# Patient Record
Sex: FEMALE | Race: WHITE | NOT HISPANIC OR LATINO | Employment: UNEMPLOYED | URBAN - METROPOLITAN AREA
[De-identification: names, ages, dates, MRNs, and addresses within clinical notes are randomized per-mention and may not be internally consistent; named-entity substitution may affect disease eponyms.]

---

## 2018-04-23 PROBLEM — R29.818 TRANSIENT NEUROLOGICAL SYMPTOMS: Status: ACTIVE | Noted: 2018-04-23

## 2018-09-10 ENCOUNTER — HISTORICAL (OUTPATIENT)
Dept: CARDIOLOGY | Facility: HOSPITAL | Age: 68
End: 2018-09-10

## 2020-11-30 ENCOUNTER — HISTORICAL (OUTPATIENT)
Dept: PREADMISSION TESTING | Facility: HOSPITAL | Age: 70
End: 2020-11-30

## 2020-11-30 LAB
ABS NEUT (OLG): 4.42 X10(3)/MCL (ref 2.1–9.2)
ALBUMIN SERPL-MCNC: 3.4 GM/DL (ref 3.4–4.8)
ALBUMIN/GLOB SERPL: 1 RATIO (ref 1.1–2)
ALP SERPL-CCNC: 94 UNIT/L (ref 40–150)
ALT SERPL-CCNC: 31 UNIT/L (ref 0–55)
AST SERPL-CCNC: 18 UNIT/L (ref 5–34)
BASOPHILS # BLD AUTO: 0 X10(3)/MCL (ref 0–0.2)
BASOPHILS NFR BLD AUTO: 0 %
BILIRUB SERPL-MCNC: 0.5 MG/DL
BILIRUBIN DIRECT+TOT PNL SERPL-MCNC: 0.2 MG/DL (ref 0–0.5)
BILIRUBIN DIRECT+TOT PNL SERPL-MCNC: 0.3 MG/DL (ref 0–0.8)
BUN SERPL-MCNC: 16.2 MG/DL (ref 9.8–20.1)
CALCIUM SERPL-MCNC: 9 MG/DL (ref 8.4–10.2)
CHLORIDE SERPL-SCNC: 100 MMOL/L (ref 98–107)
CO2 SERPL-SCNC: 28 MMOL/L (ref 23–31)
CREAT SERPL-MCNC: 0.71 MG/DL (ref 0.55–1.02)
EOSINOPHIL # BLD AUTO: 0.2 X10(3)/MCL (ref 0–0.9)
EOSINOPHIL NFR BLD AUTO: 3 %
ERYTHROCYTE [DISTWIDTH] IN BLOOD BY AUTOMATED COUNT: 13.5 % (ref 11.5–17)
GLOBULIN SER-MCNC: 3.4 GM/DL (ref 2.4–3.5)
GLUCOSE SERPL-MCNC: 78 MG/DL (ref 82–115)
HCT VFR BLD AUTO: 40.7 % (ref 37–47)
HGB BLD-MCNC: 12.9 GM/DL (ref 12–16)
LYMPHOCYTES # BLD AUTO: 1.6 X10(3)/MCL (ref 0.6–4.6)
LYMPHOCYTES NFR BLD AUTO: 22 %
MCH RBC QN AUTO: 28 PG (ref 27–31)
MCHC RBC AUTO-ENTMCNC: 31.7 GM/DL (ref 33–36)
MCV RBC AUTO: 88.3 FL (ref 80–94)
MONOCYTES # BLD AUTO: 1 X10(3)/MCL (ref 0.1–1.3)
MONOCYTES NFR BLD AUTO: 14 %
NEUTROPHILS # BLD AUTO: 4.42 X10(3)/MCL (ref 2.1–9.2)
NEUTROPHILS NFR BLD AUTO: 61 %
PLATELET # BLD AUTO: 455 X10(3)/MCL (ref 130–400)
PMV BLD AUTO: 9.8 FL (ref 9.4–12.4)
POTASSIUM SERPL-SCNC: 4.9 MMOL/L (ref 3.5–5.1)
PROT SERPL-MCNC: 6.8 GM/DL (ref 5.8–7.6)
RBC # BLD AUTO: 4.61 X10(6)/MCL (ref 4.2–5.4)
SODIUM SERPL-SCNC: 139 MMOL/L (ref 136–145)
WBC # SPEC AUTO: 7.3 X10(3)/MCL (ref 4.5–11.5)

## 2020-12-03 ENCOUNTER — HISTORICAL (OUTPATIENT)
Dept: ADMINISTRATIVE | Facility: HOSPITAL | Age: 70
End: 2020-12-03

## 2022-04-10 ENCOUNTER — HISTORICAL (OUTPATIENT)
Dept: ADMINISTRATIVE | Facility: HOSPITAL | Age: 72
End: 2022-04-10
Payer: MEDICARE

## 2022-04-25 VITALS
DIASTOLIC BLOOD PRESSURE: 58 MMHG | BODY MASS INDEX: 32.07 KG/M2 | SYSTOLIC BLOOD PRESSURE: 137 MMHG | HEIGHT: 62 IN | WEIGHT: 174.25 LBS

## 2022-04-30 NOTE — OP NOTE
Patient:   Teetee Soto            MRN: [id number]            FIN: [id number]               Age:   70 years     Sex:  Female     :  1950   Associated Diagnoses:   None   Author:   Cirilo Palacios MD            DATE OF SURGERY:   12/3/20    SURGEON:  Cirilo Palacios MD    PREOPERATIVE DIAGNOSIS:  Symptomatic Cholelithiasis    POST OPERATIVE DIAGNOSIS:  Same.    PROCEDURE:  Laparoscopic cholecystectomy.    ANESTHESIA:  General endotracheal anesthesia.    ESTIMATED BLOOD LOSS:  Approximately 20 cc.   Blood administered, none.    LAP AND INSTRUMENT COUNT:  Correct X2 at the end of the case.    SPECIMENS:  Gallbladder.    INDICATION AND SIGNIFICANT HISTORY:  Patient is a 70-year-old female complaining of post prandial right upper quadrant pain associated with fatty meals.  Patient was worked up clincally and found to have symptomatic cholelithiasis.  Risks and benefits of surgery was discussed with the patient who voiced understanding of risks / benefits and elected to proceed with surgery.        PROCEDURE IN DETAIL:  Once informed consents were obtained, patient was taken to the operating room and placed supine on the operating table.  After general endotracheal anesthesia was induced, the abdomen was prepped and draped in the standard sterile surgical fashion.  Periumbilical incision was made with the scalpel and the Veress needle was used to enter the abdominal cavity.  Pneumoperitoneum was then created with insufflation.  After adequate insufflation was obtained, 11 millimeter trocar port were placed through this wound.  Two additional 5 millimeter ports were placed in the right upper quadrant followed by 5 millimeter trocar placed subxiphoid.  The gallbladder was then visualized appeared to have chronic inflammatory changes and retracted both superiorly and laterally to achieve the critical view.  Dissection was carried out in lateral to medial fashion.  The cystic duct were first visualized  followed by cystic artery appropriate.  Two clips were placed twice proximally on each structure and one distally and then transected.  The gallbladder was then removed from the liver bed using the hook cautery and passed off the table as specimen through the periumbilical trocar port site.  Attention was then redirected to the gallbladder fossa, no active bleeding was noted.  Good hemostasis was visualized.  All ports were then removed under direct visualization with no active bleeding noted.  Skin was then closed with 4-0 Vicryl suture in interrupted fashion.  Skin was  cleaned and dry sterile dressing was placed on the wound.  Lap and instrument  counts were correct X2 at the end of the case.  Patient tolerated the procedure well and was transferred to recovery room in good condition.

## 2022-06-10 DIAGNOSIS — M65.332 TRIGGER MIDDLE FINGER OF LEFT HAND: Primary | ICD-10-CM

## 2022-06-13 ENCOUNTER — HOSPITAL ENCOUNTER (OUTPATIENT)
Dept: RADIOLOGY | Facility: HOSPITAL | Age: 72
Discharge: HOME | End: 2022-06-13
Attending: SPECIALIST
Payer: MEDICARE

## 2022-06-13 ENCOUNTER — ANESTHESIA EVENT (OUTPATIENT)
Dept: SURGERY | Facility: HOSPITAL | Age: 72
End: 2022-06-13
Payer: MEDICARE

## 2022-06-13 ENCOUNTER — CLINICAL SUPPORT (OUTPATIENT)
Dept: RESPIRATORY THERAPY | Facility: HOSPITAL | Age: 72
End: 2022-06-13
Attending: SPECIALIST
Payer: MEDICARE

## 2022-06-13 DIAGNOSIS — M65.332 TRIGGER MIDDLE FINGER OF LEFT HAND: ICD-10-CM

## 2022-06-13 DIAGNOSIS — Z01.818 PRE-OP EXAM: ICD-10-CM

## 2022-06-13 PROCEDURE — 71046 X-RAY EXAM CHEST 2 VIEWS: CPT | Mod: TC

## 2022-06-13 PROCEDURE — 93005 ELECTROCARDIOGRAM TRACING: CPT

## 2022-06-13 RX ORDER — VIT C/E/ZN/COPPR/LUTEIN/ZEAXAN 250MG-90MG
1000 CAPSULE ORAL EVERY MORNING
COMMUNITY

## 2022-06-13 RX ORDER — LISINOPRIL 20 MG/1
20 TABLET ORAL 2 TIMES DAILY
Status: ON HOLD | COMMUNITY
Start: 2021-07-08 | End: 2023-11-10 | Stop reason: SDUPTHER

## 2022-06-13 RX ORDER — MELOXICAM 7.5 MG/1
7.5 TABLET ORAL DAILY
COMMUNITY
Start: 2021-07-08

## 2022-06-13 RX ORDER — CALCIUM CARBONATE/VITAMIN D3 500-10/5ML
1 LIQUID (ML) ORAL 2 TIMES DAILY
COMMUNITY

## 2022-06-13 RX ORDER — TRIAMTERENE AND HYDROCHLOROTHIAZIDE 37.5; 25 MG/1; MG/1
1 CAPSULE ORAL NIGHTLY
Status: ON HOLD | COMMUNITY
Start: 2022-05-19 | End: 2023-11-10 | Stop reason: HOSPADM

## 2022-06-13 RX ORDER — IBUPROFEN 100 MG/5ML
1000 SUSPENSION, ORAL (FINAL DOSE FORM) ORAL EVERY MORNING
COMMUNITY

## 2022-06-13 RX ORDER — METOPROLOL TARTRATE 50 MG/1
50 TABLET ORAL NIGHTLY
COMMUNITY
Start: 2022-05-19 | End: 2023-08-17

## 2022-06-13 RX ORDER — PRAVASTATIN SODIUM 20 MG/1
20 TABLET ORAL NIGHTLY
COMMUNITY
Start: 2022-05-11 | End: 2023-08-17

## 2022-06-13 RX ORDER — ASPIRIN 81 MG/1
81 TABLET ORAL NIGHTLY
COMMUNITY

## 2022-06-13 NOTE — ANESTHESIA PREPROCEDURE EVALUATION
06/13/2022  Teetee Soto is a 71 y.o., female.      Pre-op Assessment    I have reviewed the Patient Summary Reports.     I have reviewed the Nursing Notes. I have reviewed the NPO Status.   I have reviewed the Medications.     Review of Systems  Anesthesia Hx:  Denies Family Hx of Anesthesia complications.   Denies Personal Hx of Anesthesia complications.   Social:  Former Smoker, No Alcohol Use    Hematology/Oncology:  Hematology Normal   Oncology Normal     EENT/Dental:EENT/Dental Normal   Cardiovascular:   Hypertension ECG has been reviewed. No old EKG available for comparison.   Pulmonary:  Pulmonary Normal    Renal/:  Renal/ Normal     Hepatic/GI:  Hepatic/GI Normal    Neurological:  Neurology Normal    Endocrine:  Endocrine Normal           Anesthesia Plan  Type of Anesthesia, risks & benefits discussed:    Anesthesia Type: Gen Natural Airway  Intra-op Monitoring Plan: Standard ASA Monitors  Post Op Pain Control Plan: multimodal analgesia  Induction:  IV  Informed Consent: Informed consent signed with the Patient and all parties understand the risks and agree with anesthesia plan.  All questions answered. Patient consented to blood products? Yes  ASA Score: 3    Ready For Surgery From Anesthesia Perspective.     .

## 2022-06-14 ENCOUNTER — HOSPITAL ENCOUNTER (OUTPATIENT)
Facility: HOSPITAL | Age: 72
Discharge: HOME | End: 2022-06-14
Attending: SPECIALIST | Admitting: SPECIALIST
Payer: MEDICARE

## 2022-06-14 ENCOUNTER — ANESTHESIA (OUTPATIENT)
Dept: SURGERY | Facility: HOSPITAL | Age: 72
End: 2022-06-14
Payer: MEDICARE

## 2022-06-14 DIAGNOSIS — M65.332 TRIGGER FINGER, LEFT MIDDLE FINGER: Primary | ICD-10-CM

## 2022-06-14 DIAGNOSIS — M65.30 TRIGGER FINGER OF LEFT HAND: ICD-10-CM

## 2022-06-14 PROCEDURE — 63600175 PHARM REV CODE 636 W HCPCS

## 2022-06-14 PROCEDURE — 37000008 HC ANESTHESIA 1ST 15 MINUTES: Performed by: SPECIALIST

## 2022-06-14 PROCEDURE — 36000707: Performed by: SPECIALIST

## 2022-06-14 PROCEDURE — 71000015 HC POSTOP RECOV 1ST HR: Performed by: SPECIALIST

## 2022-06-14 PROCEDURE — 37000009 HC ANESTHESIA EA ADD 15 MINS: Performed by: SPECIALIST

## 2022-06-14 PROCEDURE — 71000016 HC POSTOP RECOV ADDL HR: Performed by: SPECIALIST

## 2022-06-14 PROCEDURE — 36000706: Performed by: SPECIALIST

## 2022-06-14 PROCEDURE — 25000003 PHARM REV CODE 250: Performed by: SPECIALIST

## 2022-06-14 PROCEDURE — 63600175 PHARM REV CODE 636 W HCPCS: Performed by: ANESTHESIOLOGY

## 2022-06-14 PROCEDURE — 25000003 PHARM REV CODE 250

## 2022-06-14 RX ORDER — HYDROCODONE BITARTRATE AND ACETAMINOPHEN 10; 325 MG/1; MG/1
1 TABLET ORAL EVERY 8 HOURS PRN
Qty: 20 TABLET | Refills: 0 | Status: SHIPPED | OUTPATIENT
Start: 2022-06-14 | End: 2023-08-17

## 2022-06-14 RX ORDER — HYDROCODONE BITARTRATE AND ACETAMINOPHEN 5; 325 MG/1; MG/1
1 TABLET ORAL EVERY 6 HOURS PRN
Status: DISCONTINUED | OUTPATIENT
Start: 2022-06-14 | End: 2022-06-14 | Stop reason: HOSPADM

## 2022-06-14 RX ORDER — PROPOFOL 10 MG/ML
INJECTION, EMULSION INTRAVENOUS
Status: DISCONTINUED | OUTPATIENT
Start: 2022-06-14 | End: 2022-06-14

## 2022-06-14 RX ORDER — DOXYCYCLINE 100 MG/1
100 CAPSULE ORAL EVERY 12 HOURS
Qty: 14 CAPSULE | Refills: 0 | Status: SHIPPED | OUTPATIENT
Start: 2022-06-14 | End: 2022-06-21

## 2022-06-14 RX ORDER — MORPHINE SULFATE 10 MG/ML
2 INJECTION INTRAMUSCULAR; INTRAVENOUS; SUBCUTANEOUS EVERY 4 HOURS PRN
Status: DISCONTINUED | OUTPATIENT
Start: 2022-06-14 | End: 2022-06-14 | Stop reason: HOSPADM

## 2022-06-14 RX ORDER — SODIUM CHLORIDE, SODIUM LACTATE, POTASSIUM CHLORIDE, CALCIUM CHLORIDE 600; 310; 30; 20 MG/100ML; MG/100ML; MG/100ML; MG/100ML
INJECTION, SOLUTION INTRAVENOUS CONTINUOUS
Status: ACTIVE | OUTPATIENT
Start: 2022-06-14

## 2022-06-14 RX ORDER — BUPIVACAINE HYDROCHLORIDE 5 MG/ML
INJECTION, SOLUTION EPIDURAL; INTRACAUDAL
Status: DISCONTINUED | OUTPATIENT
Start: 2022-06-14 | End: 2022-06-14 | Stop reason: HOSPADM

## 2022-06-14 RX ORDER — LIDOCAINE HCL/PF 100 MG/5ML
SYRINGE (ML) INTRAVENOUS
Status: DISCONTINUED | OUTPATIENT
Start: 2022-06-14 | End: 2022-06-14

## 2022-06-14 RX ORDER — TRAMADOL HYDROCHLORIDE 50 MG/1
50 TABLET ORAL EVERY 6 HOURS PRN
Status: DISCONTINUED | OUTPATIENT
Start: 2022-06-14 | End: 2022-06-14 | Stop reason: HOSPADM

## 2022-06-14 RX ORDER — ONDANSETRON 4 MG/1
8 TABLET, ORALLY DISINTEGRATING ORAL EVERY 8 HOURS PRN
Status: DISCONTINUED | OUTPATIENT
Start: 2022-06-14 | End: 2022-06-14 | Stop reason: HOSPADM

## 2022-06-14 RX ORDER — ONDANSETRON 2 MG/ML
4 INJECTION INTRAMUSCULAR; INTRAVENOUS EVERY 12 HOURS PRN
Status: DISCONTINUED | OUTPATIENT
Start: 2022-06-14 | End: 2022-06-14 | Stop reason: HOSPADM

## 2022-06-14 RX ADMIN — LIDOCAINE HYDROCHLORIDE 20 MG: 20 INJECTION, SOLUTION INTRAVENOUS at 06:06

## 2022-06-14 RX ADMIN — SODIUM CHLORIDE, POTASSIUM CHLORIDE, SODIUM LACTATE AND CALCIUM CHLORIDE: 600; 310; 30; 20 INJECTION, SOLUTION INTRAVENOUS at 05:06

## 2022-06-14 RX ADMIN — PROPOFOL 60 MG: 10 INJECTION, EMULSION INTRAVENOUS at 06:06

## 2022-06-14 NOTE — OP NOTE
Operative note    Date: 6/14/2022     Preop diagnosis:  Left long trigger finger    Postop diagnosis:  Same    Procedure:  Left long trigger finger release    Surgeon:  Adi Brower MD    Assistant: EZE Cornejo    Procedure in detail:  This patient was brought to the operating room.  He was administered a local anesthetic over the A1 pulley of the left long finger.  His arm was prepped and draped.  The tourniquet was inflated to 250 mmHg.  A transverse incision was made directly over the A1 pulley of the left ring finger.  Using loupe magnification we clearly identified the A1 pulley.  Retractors were positioned to protect surrounding structures.  Under direct visualization the A1 pulley was released with a 15 blade scalpel.  The tendon was inspected and found to be intact.  Following this the patient was able to actively flex and extend the fingers fully with no further triggering.  The wound was irrigated with saline.  The skin was closed with 4-0 nylon.  A sterile dressing was applied.  The patient tolerated the procedure well.    Adi Brower MD

## 2022-06-14 NOTE — ANESTHESIA POSTPROCEDURE EVALUATION
Anesthesia Post Evaluation    Patient: Teetee Soto    Procedure(s) Performed: Procedure(s) (LRB):  RELEASE, TRIGGER FINGER / longer left finger (Left)    Final Anesthesia Type: general      Patient location during evaluation: [facility]  Patient participation: Yes- Able to Participate  Level of consciousness: awake and alert  Post-procedure vital signs: reviewed and stable  Pain management: adequate  Airway patency: patent    PONV status at discharge: No PONV  Anesthetic complications: no      Cardiovascular status: blood pressure returned to baseline and hemodynamically stable  Respiratory status: unassisted and room air  Hydration status: euvolemic  Follow-up not needed.          Vitals Value Taken Time   /69 06/14/22 0555   Temp 36 °C (96.8 °F) 06/14/22 0555   Pulse 53 06/14/22 0555   Resp 20 06/14/22 0555   SpO2 98 % 06/14/22 0555         No case tracking events are documented in the log.      Pain/Pedro Score: No data recorded

## 2022-06-14 NOTE — DISCHARGE SUMMARY
[facility] - Periop Services  Discharge Note  Short Stay    Procedure(s) (LRB):  RELEASE, TRIGGER FINGER / longer left finger (Left)    OUTCOME: Patient tolerated treatment/procedure well without complication and is now ready for discharge.    DISPOSITION: Home or Self Care    FINAL DIAGNOSIS:  Trigger finger, left middle finger    FOLLOWUP: In clinic    DISCHARGE INSTRUCTIONS:    Discharge Procedure Orders   Diet general     Keep surgical extremity elevated     Ice to affected area   Order Comments: using barrier between ice and skin (specify duration&frequency)     No driving, operating heavy equipment or signing legal documents while taking pain medication     Call MD for:  temperature >100.4     Call MD for:  persistent nausea and vomiting     Call MD for:  severe uncontrolled pain     Call MD for:  difficulty breathing, headache or visual disturbances     Call MD for:  redness, tenderness, or signs of infection (pain, swelling, redness, odor or green/yellow discharge around incision site)     Call MD for:  hives     Call MD for:  persistent dizziness or light-headedness     Remove dressing in 48 hours   Order Comments: Replace with dry dressing as needed     Weight bearing restrictions (specify)         Clinical Reference Documents Added to Patient Instructions       Document    TRIGGER FINGER RELEASE DISCHARGE INSTRUCTIONS (ENGLISH)          TIME SPENT ON DISCHARGE: 20 minutes

## 2022-06-15 VITALS
HEART RATE: 50 BPM | RESPIRATION RATE: 18 BRPM | HEIGHT: 61 IN | TEMPERATURE: 97 F | WEIGHT: 175 LBS | SYSTOLIC BLOOD PRESSURE: 157 MMHG | DIASTOLIC BLOOD PRESSURE: 70 MMHG | OXYGEN SATURATION: 98 % | BODY MASS INDEX: 33.04 KG/M2

## 2023-08-17 ENCOUNTER — OFFICE VISIT (OUTPATIENT)
Dept: OBSTETRICS AND GYNECOLOGY | Facility: CLINIC | Age: 73
End: 2023-08-17
Payer: MEDICARE

## 2023-08-17 VITALS
HEART RATE: 52 BPM | BODY MASS INDEX: 32.44 KG/M2 | HEIGHT: 62 IN | DIASTOLIC BLOOD PRESSURE: 62 MMHG | WEIGHT: 176.31 LBS | SYSTOLIC BLOOD PRESSURE: 158 MMHG

## 2023-08-17 DIAGNOSIS — B00.1 FEVER BLISTER: ICD-10-CM

## 2023-08-17 DIAGNOSIS — Z12.31 SCREENING MAMMOGRAM FOR BREAST CANCER: ICD-10-CM

## 2023-08-17 DIAGNOSIS — Z01.419 ROUTINE GYNECOLOGICAL EXAMINATION: Primary | ICD-10-CM

## 2023-08-17 PROCEDURE — G0101 CA SCREEN;PELVIC/BREAST EXAM: HCPCS | Mod: ,,, | Performed by: OBSTETRICS & GYNECOLOGY

## 2023-08-17 PROCEDURE — 88174 CYTOPATH C/V AUTO IN FLUID: CPT | Performed by: OBSTETRICS & GYNECOLOGY

## 2023-08-17 PROCEDURE — G0101 PR CA SCREEN;PELVIC/BREAST EXAM: ICD-10-PCS | Mod: ,,, | Performed by: OBSTETRICS & GYNECOLOGY

## 2023-08-17 RX ORDER — VALACYCLOVIR HYDROCHLORIDE 1 G/1
2000 TABLET, FILM COATED ORAL EVERY 12 HOURS
Qty: 4 TABLET | Refills: 3 | Status: ON HOLD | OUTPATIENT
Start: 2023-08-17 | End: 2023-11-10 | Stop reason: HOSPADM

## 2023-08-17 RX ORDER — METOPROLOL TARTRATE 25 MG/1
25 TABLET, FILM COATED ORAL 2 TIMES DAILY
COMMUNITY
Start: 2023-07-06

## 2023-08-17 RX ORDER — MOMETASONE FUROATE 1 MG/ML
SOLUTION TOPICAL 2 TIMES DAILY
Status: ON HOLD | COMMUNITY
Start: 2023-03-16 | End: 2023-11-10 | Stop reason: HOSPADM

## 2023-08-17 RX ORDER — KETOCONAZOLE 20 MG/ML
1 SHAMPOO, SUSPENSION TOPICAL
COMMUNITY
Start: 2023-03-16

## 2023-08-17 RX ORDER — FLUOXETINE 10 MG/1
10 CAPSULE ORAL NIGHTLY
COMMUNITY
Start: 2023-06-12

## 2023-08-17 RX ORDER — ROSUVASTATIN CALCIUM 20 MG/1
20 TABLET, COATED ORAL NIGHTLY
COMMUNITY
Start: 2023-07-29

## 2023-08-17 NOTE — PROGRESS NOTES
Patient ID: [id number]   Chief Complaint: Annual exam  Chief Complaint   Patient presents with    Annual Exam     NO C/O'S.     HPI:   Teetee Soto is a 73 y.o. year old  here for her Annual Exam. No LMP recorded. Patient is postmenopausal. NO C/O'S. WOULD LIKE VALTREX FOR FEVER BLISTERS TO BE REFILLED AS SHE HAS THESE OFTEN AND ARE VERY PAINFUL.    Subjective:     Past Medical History:   Diagnosis Date    Arthritis     Breast cancer     High cholesterol     HTN (hypertension)     Trigger finger, left middle finger 2022     Past Surgical History:   Procedure Laterality Date    CHOLECYSTECTOMY      COLONOSCOPY      MASTECTOMY Left     TRIGGER FINGER RELEASE Left 2022    Procedure: RELEASE, TRIGGER FINGER / longer left finger;  Surgeon: Adi Brower MD;  Location: [facility];  Service: Orthopedics;  Laterality: Left;    TUBAL LIGATION       Social History     Tobacco Use    Smoking status: Former    Smokeless tobacco: Never   Substance Use Topics    Alcohol use: Never    Drug use: Never     Family History   Problem Relation Age of Onset    No Known Problems Father     Cancer Mother     Hypertension Mother      OB History    Para Term  AB Living   3 3 3     3   SAB IAB Ectopic Multiple Live Births           3      # Outcome Date GA Lbr Myles/2nd Weight Sex Delivery Anes PTL Lv   3 Term 10/27/75    F Vag-Spont   KOBI   2 Term 70    M Vag-Spont   KOBI   1 Term 10/14/68    M Vag-Spont   KOBI       Current Outpatient Medications:     ascorbic acid, vitamin C, (VITAMIN C) 1000 MG tablet, Take 1,000 mg by mouth every morning., Disp: , Rfl:     aspirin (ECOTRIN) 81 MG EC tablet, Take 81 mg by mouth every evening. Stopped 22, Disp: , Rfl:     cholecalciferol, vitamin D3, (VITAMIN D3) 25 mcg (1,000 unit) capsule, Take 1,000 Units by mouth every morning., Disp: , Rfl:     FLUoxetine 10 MG capsule, Take 10 mg by mouth., Disp: , Rfl:     ketoconazole (NIZORAL) 2 % shampoo, Apply topically  twice a week., Disp: , Rfl:     lisinopriL (PRINIVIL,ZESTRIL) 20 MG tablet, Take 20 mg by mouth 2 (two) times a day., Disp: , Rfl:     LYSINE ORAL, Take 1,000 mg by mouth every morning., Disp: , Rfl:     magnesium oxide 400 mg magnesium Cap, Take 1 tablet by mouth 2 (two) times a day., Disp: , Rfl:     meloxicam (MOBIC) 7.5 MG tablet, Take 7.5 mg by mouth every evening., Disp: , Rfl:     metoprolol tartrate (LOPRESSOR) 25 MG tablet, Take 25 mg by mouth 2 (two) times daily., Disp: , Rfl:     mometasone (ELOCON) 0.1 % solution, Apply topically 2 (two) times daily., Disp: , Rfl:     rosuvastatin (CRESTOR) 20 MG tablet, Take 20 mg by mouth every evening., Disp: , Rfl:     triamterene-hydrochlorothiazide 37.5-25 mg (DYAZIDE) 37.5-25 mg per capsule, Take 1 capsule by mouth every evening., Disp: , Rfl:     zinc 50 mg Tab, Take 1 tablet by mouth every morning., Disp: , Rfl:     valACYclovir (VALTREX) 1000 MG tablet, Take 2 tablets (2,000 mg total) by mouth every 12 (twelve) hours. for 2 doses, Disp: 4 tablet, Rfl: 3  No current facility-administered medications for this visit.    Facility-Administered Medications Ordered in Other Visits:     lactated ringers infusion, , Intravenous, Continuous, Jose Guadalupe Luis DO, Last Rate: 10 mL/hr at 06/14/22 0550, New Bag at 06/14/22 0550    PAP:  Last PAP: 7-8-21  History of Abnormal PAP Smear: NO  HPV Vaccine: NO  INTERCOURSE:  Dyspareunia: No  Postcoital Bleeding: No  History of STI: No  CURRENT BIRTH CONTROL: MENOPAUSAL / BTL  Sexually Active: yes  BREAST HISTORY:   Last Mammogram: 8-25-22  History of Abnormal Mammogram: YES:  BREAST CANCER LEFT MASTECTOMY    MENOPAUSE:  Post Menopausal Bleeding: No  Hormone Replacement Therapy: No  COLONOSCOPY:  Last Colonoscopy: SCHEDULED 8-31-23          Review of Systems 12 point review of systems conducted, negative except as stated in the history of present illness. See HPI for details.  Objective:   Visit Vitals  BP (!) 158/62   Pulse (!)  "52   Ht 5' 2" (1.575 m)   Wt 80 kg (176 lb 4.8 oz)   BMI 32.25 kg/m²     Physical Exam:  Physical Exam  Constitutional:  General Appearance : alert, in no acute distress, normal, well nourished.  Respiratory:  Respiratory Effort: normal.  Breast:  Right: Inspection/palpation: no discharge, no masses present, no nipple retraction, no skin changes, no skin dimpling, no tenderness, no lymphadenopathy, no axillary mass, no axillary tenderness.  Left: Inspection/palpation: no discharge, no masses present, no nipple retraction, no skin changes, no skin dimpling, no tenderness, no lymphadenopathy, no axillary mass, no axillary tenderness.  Gastrointestinal:  Abdomen: no masses. no tender, nondistended.  Liver and spleen: normal  Hernias: no hernias present, no inguinal adenopathy.  Genitourinary:  External Genitalia: normal, no lesions.  Vagina: normal appearance, no abnormal discharge, no lesions.  Bladder: no mass, nontender.  Urethra: no erythema or lesions present.  Cervix: no lesions, non tender. Pap Done  Uterus: nontender, normal contour, normal mobility, normal size.   Adnexa: no masses, no tenderness.  Anus and Perineum: visually normal.   Chaperone Present  No results found for this or any previous visit (from the past 24 hour(s)).  Assessment/Plan:   Assessment:   Routine gynecological examination  -     Liquid-Based Pap Smear, Screening Screening    Screening mammogram for breast cancer  -     Mammo Digital Screening Bilat; Future; Expected date: 08/26/2023    Fever blister  -     valACYclovir (VALTREX) 1000 MG tablet; Take 2 tablets (2,000 mg total) by mouth every 12 (twelve) hours. for 2 doses  Dispense: 4 tablet; Refill: 3      Follow up in about 1 year (around 8/17/2024) for ANNUAL. In addition to their scheduled FU, the patient has also been instructed to follow up on as needed basis. All questions were answered and the patient voiced understanding of the above issues.      "

## 2023-08-22 LAB — PSYCHE PATHOLOGY RESULT: NORMAL

## 2023-08-24 ENCOUNTER — LAB VISIT (OUTPATIENT)
Dept: LAB | Facility: HOSPITAL | Age: 73
End: 2023-08-24
Attending: INTERNAL MEDICINE
Payer: MEDICARE

## 2023-08-24 DIAGNOSIS — Z86.010 PERSONAL HISTORY OF COLONIC POLYPS: ICD-10-CM

## 2023-08-24 DIAGNOSIS — Z12.11 SPECIAL SCREENING FOR MALIGNANT NEOPLASMS, COLON: Primary | ICD-10-CM

## 2023-08-24 DIAGNOSIS — I10 ESSENTIAL HYPERTENSION, MALIGNANT: ICD-10-CM

## 2023-08-24 LAB
ALBUMIN SERPL-MCNC: 4.3 G/DL (ref 3.4–4.8)
ALBUMIN/GLOB SERPL: 1.7 RATIO (ref 1.1–2)
ALP SERPL-CCNC: 79 UNIT/L (ref 40–150)
ALT SERPL-CCNC: 14 UNIT/L (ref 0–55)
AST SERPL-CCNC: 21 UNIT/L (ref 5–34)
BASOPHILS # BLD AUTO: 0.04 X10(3)/MCL
BASOPHILS NFR BLD AUTO: 0.6 %
BILIRUB SERPL-MCNC: 1 MG/DL
BUN SERPL-MCNC: 11.2 MG/DL (ref 9.8–20.1)
CALCIUM SERPL-MCNC: 9.7 MG/DL (ref 8.4–10.2)
CHLORIDE SERPL-SCNC: 104 MMOL/L (ref 98–107)
CO2 SERPL-SCNC: 28 MMOL/L (ref 23–31)
CREAT SERPL-MCNC: 0.82 MG/DL (ref 0.55–1.02)
EOSINOPHIL # BLD AUTO: 0.27 X10(3)/MCL (ref 0–0.9)
EOSINOPHIL NFR BLD AUTO: 3.8 %
ERYTHROCYTE [DISTWIDTH] IN BLOOD BY AUTOMATED COUNT: 13.3 % (ref 11.5–17)
GFR SERPLBLD CREATININE-BSD FMLA CKD-EPI: >60 MLS/MIN/1.73/M2
GLOBULIN SER-MCNC: 2.6 GM/DL (ref 2.4–3.5)
GLUCOSE SERPL-MCNC: 96 MG/DL (ref 82–115)
HCT VFR BLD AUTO: 44 % (ref 37–47)
HGB BLD-MCNC: 14.6 G/DL (ref 12–16)
IMM GRANULOCYTES # BLD AUTO: 0.01 X10(3)/MCL (ref 0–0.04)
IMM GRANULOCYTES NFR BLD AUTO: 0.1 %
LYMPHOCYTES # BLD AUTO: 1.72 X10(3)/MCL (ref 0.6–4.6)
LYMPHOCYTES NFR BLD AUTO: 24.5 %
MCH RBC QN AUTO: 28.1 PG (ref 27–31)
MCHC RBC AUTO-ENTMCNC: 33.2 G/DL (ref 33–36)
MCV RBC AUTO: 84.8 FL (ref 80–94)
MONOCYTES # BLD AUTO: 0.61 X10(3)/MCL (ref 0.1–1.3)
MONOCYTES NFR BLD AUTO: 8.7 %
NEUTROPHILS # BLD AUTO: 4.38 X10(3)/MCL (ref 2.1–9.2)
NEUTROPHILS NFR BLD AUTO: 62.3 %
NRBC BLD AUTO-RTO: 0 %
PLATELET # BLD AUTO: 227 X10(3)/MCL (ref 130–400)
PMV BLD AUTO: 10.5 FL (ref 7.4–10.4)
POTASSIUM SERPL-SCNC: 4.7 MMOL/L (ref 3.5–5.1)
PROT SERPL-MCNC: 6.9 GM/DL (ref 5.8–7.6)
RBC # BLD AUTO: 5.19 X10(6)/MCL (ref 4.2–5.4)
SODIUM SERPL-SCNC: 140 MMOL/L (ref 136–145)
WBC # SPEC AUTO: 7.03 X10(3)/MCL (ref 4.5–11.5)

## 2023-08-24 PROCEDURE — 36415 COLL VENOUS BLD VENIPUNCTURE: CPT

## 2023-08-24 PROCEDURE — 85025 COMPLETE CBC W/AUTO DIFF WBC: CPT

## 2023-08-24 PROCEDURE — 93010 EKG 12-LEAD: ICD-10-PCS | Mod: ,,, | Performed by: INTERNAL MEDICINE

## 2023-08-24 PROCEDURE — 93010 ELECTROCARDIOGRAM REPORT: CPT | Mod: ,,, | Performed by: INTERNAL MEDICINE

## 2023-08-24 PROCEDURE — 80053 COMPREHEN METABOLIC PANEL: CPT

## 2023-08-24 PROCEDURE — 93005 ELECTROCARDIOGRAM TRACING: CPT

## 2023-10-31 RX ORDER — HYDRALAZINE HYDROCHLORIDE 25 MG/1
25 TABLET, FILM COATED ORAL 3 TIMES DAILY
Status: ON HOLD | COMMUNITY
End: 2023-11-10 | Stop reason: HOSPADM

## 2023-10-31 RX ORDER — CLOPIDOGREL BISULFATE 75 MG/1
75 TABLET ORAL NIGHTLY
COMMUNITY

## 2023-11-06 ENCOUNTER — ANESTHESIA EVENT (OUTPATIENT)
Dept: CARDIOLOGY | Facility: HOSPITAL | Age: 73
DRG: 036 | End: 2023-11-06
Payer: MEDICARE

## 2023-11-08 ENCOUNTER — ANESTHESIA (OUTPATIENT)
Dept: CARDIOLOGY | Facility: HOSPITAL | Age: 73
DRG: 036 | End: 2023-11-08
Payer: MEDICARE

## 2023-11-08 ENCOUNTER — HOSPITAL ENCOUNTER (INPATIENT)
Facility: HOSPITAL | Age: 73
LOS: 2 days | Discharge: HOME | DRG: 036 | End: 2023-11-10
Attending: INTERNAL MEDICINE | Admitting: INTERNAL MEDICINE
Payer: MEDICARE

## 2023-11-08 DIAGNOSIS — I10 HYPERTENSION: ICD-10-CM

## 2023-11-08 DIAGNOSIS — I65.21 OCCLUSION OF RIGHT CAROTID ARTERY: ICD-10-CM

## 2023-11-08 DIAGNOSIS — I25.10 CAD (CORONARY ARTERY DISEASE): ICD-10-CM

## 2023-11-08 LAB
ANION GAP SERPL CALC-SCNC: 8 MEQ/L
BASOPHILS # BLD AUTO: 0.03 X10(3)/MCL
BASOPHILS NFR BLD AUTO: 0.3 %
BUN SERPL-MCNC: 15.4 MG/DL (ref 9.8–20.1)
CALCIUM SERPL-MCNC: 8.3 MG/DL (ref 8.4–10.2)
CHLORIDE SERPL-SCNC: 108 MMOL/L (ref 98–107)
CO2 SERPL-SCNC: 24 MMOL/L (ref 23–31)
CREAT SERPL-MCNC: 0.7 MG/DL (ref 0.55–1.02)
CREAT/UREA NIT SERPL: 22
EOSINOPHIL # BLD AUTO: 0.06 X10(3)/MCL (ref 0–0.9)
EOSINOPHIL NFR BLD AUTO: 0.7 %
ERYTHROCYTE [DISTWIDTH] IN BLOOD BY AUTOMATED COUNT: 13.7 % (ref 11.5–17)
GFR SERPLBLD CREATININE-BSD FMLA CKD-EPI: >60 MLS/MIN/1.73/M2
GLUCOSE SERPL-MCNC: 89 MG/DL (ref 82–115)
HCT VFR BLD AUTO: 33.9 % (ref 37–47)
HGB BLD-MCNC: 11.2 G/DL (ref 12–16)
IMM GRANULOCYTES # BLD AUTO: 0.03 X10(3)/MCL (ref 0–0.04)
IMM GRANULOCYTES NFR BLD AUTO: 0.3 %
LYMPHOCYTES # BLD AUTO: 1.35 X10(3)/MCL (ref 0.6–4.6)
LYMPHOCYTES NFR BLD AUTO: 15.5 %
MCH RBC QN AUTO: 29.4 PG (ref 27–31)
MCHC RBC AUTO-ENTMCNC: 33 G/DL (ref 33–36)
MCV RBC AUTO: 89 FL (ref 80–94)
MONOCYTES # BLD AUTO: 0.52 X10(3)/MCL (ref 0.1–1.3)
MONOCYTES NFR BLD AUTO: 6 %
NEUTROPHILS # BLD AUTO: 6.71 X10(3)/MCL (ref 2.1–9.2)
NEUTROPHILS NFR BLD AUTO: 77.2 %
NRBC BLD AUTO-RTO: 0 %
PLATELET # BLD AUTO: 157 X10(3)/MCL (ref 130–400)
PMV BLD AUTO: 10.1 FL (ref 7.4–10.4)
POC ACTIVATED CLOTTING TIME K: 137 SEC (ref 74–137)
POCT GLUCOSE: 115 MG/DL (ref 70–110)
POCT GLUCOSE: 91 MG/DL (ref 70–110)
POTASSIUM SERPL-SCNC: 4.1 MMOL/L (ref 3.5–5.1)
RBC # BLD AUTO: 3.81 X10(6)/MCL (ref 4.2–5.4)
SAMPLE: NORMAL
SODIUM SERPL-SCNC: 140 MMOL/L (ref 136–145)
WBC # SPEC AUTO: 8.7 X10(3)/MCL (ref 4.5–11.5)

## 2023-11-08 PROCEDURE — 37000009 HC ANESTHESIA EA ADD 15 MINS: Performed by: INTERNAL MEDICINE

## 2023-11-08 PROCEDURE — C1753 CATH, INTRAVAS ULTRASOUND: HCPCS | Performed by: INTERNAL MEDICINE

## 2023-11-08 PROCEDURE — 63600175 PHARM REV CODE 636 W HCPCS: Performed by: ANESTHESIOLOGY

## 2023-11-08 PROCEDURE — 63600175 PHARM REV CODE 636 W HCPCS: Performed by: REGISTERED NURSE

## 2023-11-08 PROCEDURE — 85347 COAGULATION TIME ACTIVATED: CPT | Performed by: INTERNAL MEDICINE

## 2023-11-08 PROCEDURE — 25000003 PHARM REV CODE 250: Performed by: ANESTHESIOLOGY

## 2023-11-08 PROCEDURE — 93005 ELECTROCARDIOGRAM TRACING: CPT

## 2023-11-08 PROCEDURE — 99499 UNLISTED E&M SERVICE: CPT | Mod: ,,, | Performed by: THORACIC SURGERY (CARDIOTHORACIC VASCULAR SURGERY)

## 2023-11-08 PROCEDURE — D9220A PRA ANESTHESIA: Mod: CRNA,,, | Performed by: REGISTERED NURSE

## 2023-11-08 PROCEDURE — 27201423 OPTIME MED/SURG SUP & DEVICES STERILE SUPPLY: Performed by: INTERNAL MEDICINE

## 2023-11-08 PROCEDURE — 25500020 PHARM REV CODE 255: Performed by: INTERNAL MEDICINE

## 2023-11-08 PROCEDURE — 63600150 PHARM REV CODE 636: Performed by: NURSE PRACTITIONER

## 2023-11-08 PROCEDURE — C1876 STENT, NON-COA/NON-COV W/DEL: HCPCS | Performed by: INTERNAL MEDICINE

## 2023-11-08 PROCEDURE — 21400001 HC TELEMETRY ROOM

## 2023-11-08 PROCEDURE — C1769 GUIDE WIRE: HCPCS | Performed by: INTERNAL MEDICINE

## 2023-11-08 PROCEDURE — 27000221 HC OXYGEN, UP TO 24 HOURS

## 2023-11-08 PROCEDURE — 37215 TRANSCATH STENT CCA W/EPS: CPT | Performed by: INTERNAL MEDICINE

## 2023-11-08 PROCEDURE — D9220A PRA ANESTHESIA: ICD-10-PCS | Mod: CRNA,,, | Performed by: REGISTERED NURSE

## 2023-11-08 PROCEDURE — C1884 EMBOLIZATION PROTECT SYST: HCPCS | Performed by: INTERNAL MEDICINE

## 2023-11-08 PROCEDURE — 36620 ARTERIAL: ICD-10-PCS | Mod: 59,,, | Performed by: ANESTHESIOLOGY

## 2023-11-08 PROCEDURE — D9220A PRA ANESTHESIA: Mod: ANES,,, | Performed by: ANESTHESIOLOGY

## 2023-11-08 PROCEDURE — 37000008 HC ANESTHESIA 1ST 15 MINUTES: Performed by: INTERNAL MEDICINE

## 2023-11-08 PROCEDURE — P9047 ALBUMIN (HUMAN), 25%, 50ML: HCPCS | Mod: JZ,JG | Performed by: ANESTHESIOLOGY

## 2023-11-08 PROCEDURE — D9220A PRA ANESTHESIA: ICD-10-PCS | Mod: ANES,,, | Performed by: ANESTHESIOLOGY

## 2023-11-08 PROCEDURE — 99499 NO LOS: ICD-10-PCS | Mod: ,,, | Performed by: THORACIC SURGERY (CARDIOTHORACIC VASCULAR SURGERY)

## 2023-11-08 PROCEDURE — 11000001 HC ACUTE MED/SURG PRIVATE ROOM

## 2023-11-08 PROCEDURE — 25000003 PHARM REV CODE 250: Performed by: REGISTERED NURSE

## 2023-11-08 PROCEDURE — 25000003 PHARM REV CODE 250: Performed by: INTERNAL MEDICINE

## 2023-11-08 PROCEDURE — 80048 BASIC METABOLIC PNL TOTAL CA: CPT | Performed by: NURSE PRACTITIONER

## 2023-11-08 PROCEDURE — 85025 COMPLETE CBC W/AUTO DIFF WBC: CPT | Performed by: NURSE PRACTITIONER

## 2023-11-08 PROCEDURE — C1725 CATH, TRANSLUMIN NON-LASER: HCPCS | Performed by: INTERNAL MEDICINE

## 2023-11-08 PROCEDURE — 37252 INTRVASC US NONCORONARY 1ST: CPT | Performed by: INTERNAL MEDICINE

## 2023-11-08 PROCEDURE — C1894 INTRO/SHEATH, NON-LASER: HCPCS | Performed by: INTERNAL MEDICINE

## 2023-11-08 PROCEDURE — 36620 INSERTION CATHETER ARTERY: CPT | Mod: 59,,, | Performed by: ANESTHESIOLOGY

## 2023-11-08 DEVICE — 9 MM X 30 MM
Type: IMPLANTABLE DEVICE | Site: CAROTID | Status: FUNCTIONAL
Brand: ENROUTE TRANSCAROTID STENT

## 2023-11-08 RX ORDER — HYDRALAZINE HYDROCHLORIDE 20 MG/ML
10 INJECTION INTRAMUSCULAR; INTRAVENOUS EVERY 4 HOURS PRN
Status: DISCONTINUED | OUTPATIENT
Start: 2023-11-08 | End: 2023-11-10 | Stop reason: HOSPADM

## 2023-11-08 RX ORDER — ACETAMINOPHEN 325 MG/1
650 TABLET ORAL EVERY 4 HOURS PRN
Status: DISCONTINUED | OUTPATIENT
Start: 2023-11-08 | End: 2023-11-10 | Stop reason: HOSPADM

## 2023-11-08 RX ORDER — PROPOFOL 10 MG/ML
VIAL (ML) INTRAVENOUS
Status: DISCONTINUED | OUTPATIENT
Start: 2023-11-08 | End: 2023-11-08

## 2023-11-08 RX ORDER — HYDROCODONE BITARTRATE AND ACETAMINOPHEN 5; 325 MG/1; MG/1
1 TABLET ORAL EVERY 4 HOURS PRN
Status: DISCONTINUED | OUTPATIENT
Start: 2023-11-08 | End: 2023-11-10 | Stop reason: HOSPADM

## 2023-11-08 RX ORDER — PSEUDOEPHEDRINE HCL 30 MG
60 TABLET ORAL 3 TIMES DAILY PRN
Status: DISCONTINUED | OUTPATIENT
Start: 2023-11-08 | End: 2023-11-10 | Stop reason: HOSPADM

## 2023-11-08 RX ORDER — ASPIRIN 81 MG/1
81 TABLET ORAL DAILY
Status: DISCONTINUED | OUTPATIENT
Start: 2023-11-09 | End: 2023-11-10 | Stop reason: HOSPADM

## 2023-11-08 RX ORDER — SODIUM CHLORIDE 0.9 % (FLUSH) 0.9 %
10 SYRINGE (ML) INJECTION
Status: DISCONTINUED | OUTPATIENT
Start: 2023-11-08 | End: 2023-11-08 | Stop reason: HOSPADM

## 2023-11-08 RX ORDER — ROCURONIUM BROMIDE 10 MG/ML
INJECTION, SOLUTION INTRAVENOUS
Status: DISCONTINUED | OUTPATIENT
Start: 2023-11-08 | End: 2023-11-08

## 2023-11-08 RX ORDER — ONDANSETRON 2 MG/ML
4 INJECTION INTRAMUSCULAR; INTRAVENOUS ONCE AS NEEDED
Status: DISCONTINUED | OUTPATIENT
Start: 2023-11-08 | End: 2023-11-08 | Stop reason: HOSPADM

## 2023-11-08 RX ORDER — CLOPIDOGREL BISULFATE 75 MG/1
75 TABLET ORAL DAILY
Status: DISCONTINUED | OUTPATIENT
Start: 2023-11-09 | End: 2023-11-10 | Stop reason: HOSPADM

## 2023-11-08 RX ORDER — DOPAMINE HYDROCHLORIDE 320 MG/100ML
2 INJECTION, SOLUTION INTRAVENOUS CONTINUOUS
Status: DISCONTINUED | OUTPATIENT
Start: 2023-11-08 | End: 2023-11-09

## 2023-11-08 RX ORDER — PROTAMINE SULFATE 10 MG/ML
INJECTION, SOLUTION INTRAVENOUS
Status: DISCONTINUED | OUTPATIENT
Start: 2023-11-08 | End: 2023-11-08

## 2023-11-08 RX ORDER — HEPARIN SODIUM 1000 [USP'U]/ML
INJECTION, SOLUTION INTRAVENOUS; SUBCUTANEOUS
Status: DISCONTINUED | OUTPATIENT
Start: 2023-11-08 | End: 2023-11-08

## 2023-11-08 RX ORDER — SODIUM CHLORIDE 9 MG/ML
INJECTION, SOLUTION INTRAVENOUS ONCE
Status: DISCONTINUED | OUTPATIENT
Start: 2023-11-08 | End: 2023-11-08 | Stop reason: HOSPADM

## 2023-11-08 RX ORDER — LIDOCAINE HYDROCHLORIDE 20 MG/ML
INJECTION, SOLUTION EPIDURAL; INFILTRATION; INTRACAUDAL; PERINEURAL
Status: DISCONTINUED | OUTPATIENT
Start: 2023-11-08 | End: 2023-11-08

## 2023-11-08 RX ORDER — ALBUMIN HUMAN 250 G/1000ML
25 SOLUTION INTRAVENOUS ONCE
Status: COMPLETED | OUTPATIENT
Start: 2023-11-08 | End: 2023-11-08

## 2023-11-08 RX ORDER — ONDANSETRON HYDROCHLORIDE 2 MG/ML
INJECTION, SOLUTION INTRAMUSCULAR; INTRAVENOUS
Status: DISCONTINUED | OUTPATIENT
Start: 2023-11-08 | End: 2023-11-08

## 2023-11-08 RX ORDER — EPHEDRINE SULFATE 50 MG/ML
INJECTION, SOLUTION INTRAVENOUS
Status: DISCONTINUED | OUTPATIENT
Start: 2023-11-08 | End: 2023-11-08

## 2023-11-08 RX ORDER — SODIUM CHLORIDE 9 MG/ML
INJECTION, SOLUTION INTRAVENOUS CONTINUOUS
Status: ACTIVE | OUTPATIENT
Start: 2023-11-08 | End: 2023-11-08

## 2023-11-08 RX ORDER — PHENYLEPHRINE HYDROCHLORIDE 10 MG/ML
INJECTION INTRAVENOUS
Status: DISPENSED
Start: 2023-11-08 | End: 2023-11-08

## 2023-11-08 RX ORDER — PHENYLEPHRINE HCL IN 0.9% NACL 1 MG/10 ML
SYRINGE (ML) INTRAVENOUS
Status: DISCONTINUED | OUTPATIENT
Start: 2023-11-08 | End: 2023-11-08

## 2023-11-08 RX ORDER — FENTANYL CITRATE 50 UG/ML
INJECTION, SOLUTION INTRAMUSCULAR; INTRAVENOUS
Status: DISCONTINUED | OUTPATIENT
Start: 2023-11-08 | End: 2023-11-08

## 2023-11-08 RX ORDER — MIDODRINE HYDROCHLORIDE 5 MG/1
10 TABLET ORAL 3 TIMES DAILY PRN
Status: DISCONTINUED | OUTPATIENT
Start: 2023-11-08 | End: 2023-11-10 | Stop reason: HOSPADM

## 2023-11-08 RX ORDER — MORPHINE SULFATE 4 MG/ML
2 INJECTION, SOLUTION INTRAMUSCULAR; INTRAVENOUS EVERY 4 HOURS PRN
Status: DISCONTINUED | OUTPATIENT
Start: 2023-11-08 | End: 2023-11-10 | Stop reason: HOSPADM

## 2023-11-08 RX ORDER — ONDANSETRON 4 MG/1
8 TABLET, ORALLY DISINTEGRATING ORAL EVERY 8 HOURS PRN
Status: DISCONTINUED | OUTPATIENT
Start: 2023-11-08 | End: 2023-11-10 | Stop reason: HOSPADM

## 2023-11-08 RX ADMIN — ALBUMIN (HUMAN) 25 G: 12.5 SOLUTION INTRAVENOUS at 09:11

## 2023-11-08 RX ADMIN — PSEUDOEPHEDRINE HCL 60 MG: 30 TABLET, FILM COATED ORAL at 09:11

## 2023-11-08 RX ADMIN — SODIUM CHLORIDE, SODIUM GLUCONATE, SODIUM ACETATE, POTASSIUM CHLORIDE AND MAGNESIUM CHLORIDE: 526; 502; 368; 37; 30 INJECTION, SOLUTION INTRAVENOUS at 07:11

## 2023-11-08 RX ADMIN — PROPOFOL 80 MG: 10 INJECTION, EMULSION INTRAVENOUS at 07:11

## 2023-11-08 RX ADMIN — DOPAMINE HYDROCHLORIDE IN DEXTROSE 5 MCG/KG/MIN: 3.2 INJECTION, SOLUTION INTRAVENOUS at 01:11

## 2023-11-08 RX ADMIN — SODIUM CHLORIDE: 9 INJECTION, SOLUTION INTRAVENOUS at 11:11

## 2023-11-08 RX ADMIN — FENTANYL CITRATE 50 MCG: 50 INJECTION, SOLUTION INTRAMUSCULAR; INTRAVENOUS at 07:11

## 2023-11-08 RX ADMIN — EPHEDRINE SULFATE 15 MG: 50 INJECTION INTRAVENOUS at 08:11

## 2023-11-08 RX ADMIN — SUGAMMADEX 160 MG: 100 INJECTION, SOLUTION INTRAVENOUS at 08:11

## 2023-11-08 RX ADMIN — HEPARIN SODIUM 9000 UNITS: 1000 INJECTION, SOLUTION INTRAVENOUS; SUBCUTANEOUS at 07:11

## 2023-11-08 RX ADMIN — PHENYLEPHRINE HYDROCHLORIDE 0.5 MCG/KG/MIN: 10 INJECTION INTRAVENOUS at 09:11

## 2023-11-08 RX ADMIN — EPHEDRINE SULFATE 10 MG: 50 INJECTION INTRAVENOUS at 08:11

## 2023-11-08 RX ADMIN — EPHEDRINE SULFATE 25 MG: 50 INJECTION INTRAVENOUS at 08:11

## 2023-11-08 RX ADMIN — MIDODRINE HYDROCHLORIDE 10 MG: 5 TABLET ORAL at 11:11

## 2023-11-08 RX ADMIN — ONDANSETRON 8 MG: 4 TABLET, ORALLY DISINTEGRATING ORAL at 01:11

## 2023-11-08 RX ADMIN — PROTAMINE SULFATE 90 MG: 10 INJECTION, SOLUTION INTRAVENOUS at 08:11

## 2023-11-08 RX ADMIN — SODIUM CHLORIDE: 9 INJECTION, SOLUTION INTRAVENOUS at 09:11

## 2023-11-08 RX ADMIN — LIDOCAINE HYDROCHLORIDE 80 MG: 20 INJECTION, SOLUTION EPIDURAL; INFILTRATION; INTRACAUDAL; PERINEURAL at 07:11

## 2023-11-08 RX ADMIN — Medication 100 MCG: at 08:11

## 2023-11-08 RX ADMIN — ROCURONIUM BROMIDE 50 MG: 10 SOLUTION INTRAVENOUS at 07:11

## 2023-11-08 RX ADMIN — VANCOMYCIN HYDROCHLORIDE 1000 MG: 1 INJECTION, POWDER, LYOPHILIZED, FOR SOLUTION INTRAVENOUS at 07:11

## 2023-11-08 RX ADMIN — FENTANYL CITRATE 50 MCG: 50 INJECTION, SOLUTION INTRAMUSCULAR; INTRAVENOUS at 08:11

## 2023-11-08 RX ADMIN — ONDANSETRON 4 MG: 2 INJECTION INTRAMUSCULAR; INTRAVENOUS at 07:11

## 2023-11-08 NOTE — ANESTHESIA PROCEDURE NOTES
Arterial    Diagnosis: carotiod stenosis    Patient location during procedure: done in OR  Procedure start time: 11/8/2023 7:31 AM  Timeout: 11/8/2023 7:30 AM  Procedure end time: 11/8/2023 7:33 AM    Staffing  Authorizing Provider: Sky Bradford MD  Performing Provider: Angelica Judd CRNA    Staffing  Performed by: Angelica Judd CRNA  Authorized by: Sky Bradford MD    Anesthesiologist was present at the time of the procedure.    Preanesthetic Checklist  Completed: patient identified, IV checked, site marked, risks and benefits discussed, surgical consent, monitors and equipment checked, pre-op evaluation, timeout performed and anesthesia consent givenArterial  Skin Prep: chlorhexidine gluconate  Local Infiltration: none  Orientation: right  Location: radial    Catheter Size: 20 G  Catheter placement by Anatomical landmarks and Ultrasound guidance. Heme positive aspiration all ports.   Vessel Caliber: small, patent, compressibility normal  Needle advanced into vessel with real time Ultrasound guidance.Insertion Attempts: 2  Assessment  Dressing: secured with tape and tegaderm  Patient: Tolerated well  Additional Notes  Performed by Dr. Bradford

## 2023-11-08 NOTE — NURSING
Nurses Note -- 4 Eyes      11/8/2023   5:25 PM      Skin assessed during: Admit      [x] No Altered Skin Integrity Present    []Prevention Measures Documented      [] Yes- Altered Skin Integrity Present or Discovered   [] LDA Added if Not in Epic (Describe Wound)   [] New Altered Skin Integrity was Present on Admit and Documented in LDA   [] Wound Image Taken    Wound Care Consulted? No    Attending Nurse:  Barb Samaniego RN    Second RN/Staff Member:   AYANNA Adamson RN

## 2023-11-08 NOTE — ANESTHESIA POSTPROCEDURE EVALUATION
Anesthesia Post Evaluation    Patient: Teetee Soto    Procedure(s) Performed: Procedure(s) (LRB):  Stent, Carotid W/ Protect (N/A)    Final Anesthesia Type: general      Patient location during evaluation: PACU  Patient participation: Yes- Able to Participate  Level of consciousness: oriented and awake  Post-procedure vital signs: reviewed and stable  Pain management: adequate  Airway patency: patent    PONV status at discharge: No PONV  Anesthetic complications: no      Cardiovascular status: hemodynamically stable  Respiratory status: spontaneous ventilation and unassisted  Hydration status: euvolemic  Follow-up not needed.  Comments: [facility]          Vitals Value Taken Time   /50 11/08/23 1040   Temp 36 °C (96.8 °F) 11/08/23 0831   Pulse 57 11/08/23 1040   Resp 15 11/08/23 1040   SpO2 94 % 11/08/23 1040         Event Time   Out of Recovery 10:40:00         Pain/Pedro Score: Pedro Score: 10 (11/8/2023 10:30 AM)

## 2023-11-08 NOTE — Clinical Note
A percutaneous stick to the left carotid vein was performed. Ultrasound guidance was used to obtain access.

## 2023-11-08 NOTE — ANESTHESIA PROCEDURE NOTES
Intubation    Date/Time: 11/8/2023 7:26 AM    Performed by: Angelica Judd CRNA  Authorized by: Sky Bradford MD    Intubation:     Induction:  Intravenous    Intubated:  Postinduction    Mask Ventilation:  Easy with oral airway    Attempts:  1    Attempted By:  CRNA    Method of Intubation:  Direct    Blade:  [place] 2    Laryngeal View Grade: Grade I - full view of cords      Difficult Airway Encountered?: No      Complications:  None    Airway Device:  Oral endotracheal tube    Airway Device Size:  7.0    Style/Cuff Inflation:  Cuffed (inflated to minimal occlusive pressure)    Inflation Amount (mL):  7    Tube secured:  21    Secured at:  The lips    Placement Verified By:  Capnometry    Complicating Factors:  None    Findings Post-Intubation:  BS equal bilateral and atraumatic/condition of teeth unchanged

## 2023-11-08 NOTE — Clinical Note
The left groin, right chest and right neck was prepped. The site was prepped with ChloraPrep. The site was clipped. The patient was draped.

## 2023-11-08 NOTE — Clinical Note
A percutaneous stick to the right carotid artery was performed. Ultrasound guidance was used to obtain access.

## 2023-11-08 NOTE — ANESTHESIA PREPROCEDURE EVALUATION
11/07/2023  Teetee Soto is a 73 y.o., female , who presents for the following:    Procedure: Stent, Carotid W/ Protect - TCAR PORSHA W/ ANEST.   Anesthesia type: General   Diagnosis: Occlusion of right carotid artery [I65.21]   Pre-op diagnosis: Occlusion of right carotid artery [I65.21]   Location: [facility] CATH LAB 05 / [facility] CATH LAB   Providers: Alec Quevedo MD           LAB:  08/24 0923    HGB 14.6       WBC 7.03       Platelets 227              K+ 4.7       Creatinine 0.82       Glucose 96              Pre-op Assessment    I have reviewed the Patient Summary Reports.     I have reviewed the Nursing Notes. I have reviewed the NPO Status.   I have reviewed the Medications.     Review of Systems  Anesthesia Hx:  No problems with previous Anesthesia  Denies Family Hx of Anesthesia complications.   Denies Personal Hx of Anesthesia complications.   Hematology/Oncology:        Oncology Comments: Left Breast CA, s/p Left Mastectomy   Cardiovascular:   > 4 METS activity  Asymptomatic AYAZ       Physical Exam  General: Alert and Oriented    Airway:  Mallampati: II   Mouth Opening: Normal  TM Distance: Normal  Tongue: Normal  Neck ROM: Normal ROM    Chest/Lungs:  Normal Respiratory Rate    Heart:  Rate: Normal  Rhythm: Regular Rhythm        Anesthesia Plan  Type of Anesthesia, risks & benefits discussed:    Anesthesia Type: Gen ETT  Intra-op Monitoring Plan: Standard ASA Monitors and Art Line  Post Op Pain Control Plan: IV/PO Opioids PRN and multimodal analgesia  Induction:  IV  Airway Plan: Direct, Post-Induction  Informed Consent: Informed consent signed with the Patient and all parties understand the risks and agree with anesthesia plan.  All questions answered. Patient consented to blood products? Yes  ASA Score: 3  Day of Surgery Review of History & Physical: H&P Update referred to the  surgeon/provider.    Ready For Surgery From Anesthesia Perspective.     .

## 2023-11-08 NOTE — TRANSFER OF CARE
"Anesthesia Transfer of Care Note    Patient: Teetee Soto    Procedure(s) Performed: Procedure(s) (LRB):  Stent, Carotid W/ Protect (N/A)    Patient location: PACU    Anesthesia Type: general    Transport from OR: Transported from OR on room air with adequate spontaneous ventilation    Post pain: adequate analgesia    Post assessment: no apparent anesthetic complications and tolerated procedure well    Post vital signs: stable    Level of consciousness: awake, alert, oriented and responds to stimulation    Nausea/Vomiting: no nausea/vomiting    Complications: none    Transfer of care protocol was followedComments: Moves all extremities      Last vitals:   Visit Vitals  /73 (Patient Position: Lying)   Pulse 63   Temp 36 °C (96.8 °F) (Skin)   Resp 14   Ht 5' 2" (1.575 m)   Wt 79.9 kg (176 lb 2.4 oz)   SpO2 95%   Breastfeeding No   BMI 32.22 kg/m²     "

## 2023-11-09 LAB
ANION GAP SERPL CALC-SCNC: 6 MEQ/L
BASOPHILS # BLD AUTO: 0.02 X10(3)/MCL
BASOPHILS NFR BLD AUTO: 0.2 %
BUN SERPL-MCNC: 15 MG/DL (ref 9.8–20.1)
CALCIUM SERPL-MCNC: 8.7 MG/DL (ref 8.4–10.2)
CHLORIDE SERPL-SCNC: 106 MMOL/L (ref 98–107)
CO2 SERPL-SCNC: 25 MMOL/L (ref 23–31)
CREAT SERPL-MCNC: 0.75 MG/DL (ref 0.55–1.02)
CREAT/UREA NIT SERPL: 20
EOSINOPHIL # BLD AUTO: 0.01 X10(3)/MCL (ref 0–0.9)
EOSINOPHIL NFR BLD AUTO: 0.1 %
ERYTHROCYTE [DISTWIDTH] IN BLOOD BY AUTOMATED COUNT: 13.7 % (ref 11.5–17)
GFR SERPLBLD CREATININE-BSD FMLA CKD-EPI: >60 MLS/MIN/1.73/M2
GLUCOSE SERPL-MCNC: 127 MG/DL (ref 82–115)
HCT VFR BLD AUTO: 34.4 % (ref 37–47)
HGB BLD-MCNC: 11.3 G/DL (ref 12–16)
IMM GRANULOCYTES # BLD AUTO: 0.04 X10(3)/MCL (ref 0–0.04)
IMM GRANULOCYTES NFR BLD AUTO: 0.3 %
LYMPHOCYTES # BLD AUTO: 1.21 X10(3)/MCL (ref 0.6–4.6)
LYMPHOCYTES NFR BLD AUTO: 9.9 %
MCH RBC QN AUTO: 29 PG (ref 27–31)
MCHC RBC AUTO-ENTMCNC: 32.8 G/DL (ref 33–36)
MCV RBC AUTO: 88.2 FL (ref 80–94)
MONOCYTES # BLD AUTO: 1.17 X10(3)/MCL (ref 0.1–1.3)
MONOCYTES NFR BLD AUTO: 9.6 %
NEUTROPHILS # BLD AUTO: 9.75 X10(3)/MCL (ref 2.1–9.2)
NEUTROPHILS NFR BLD AUTO: 79.9 %
NRBC BLD AUTO-RTO: 0 %
PLATELET # BLD AUTO: 181 X10(3)/MCL (ref 130–400)
PMV BLD AUTO: 10.5 FL (ref 7.4–10.4)
POCT GLUCOSE: 108 MG/DL (ref 70–110)
POCT GLUCOSE: 123 MG/DL (ref 70–110)
POCT GLUCOSE: 126 MG/DL (ref 70–110)
POCT GLUCOSE: 143 MG/DL (ref 70–110)
POTASSIUM SERPL-SCNC: 3.6 MMOL/L (ref 3.5–5.1)
RBC # BLD AUTO: 3.9 X10(6)/MCL (ref 4.2–5.4)
SODIUM SERPL-SCNC: 137 MMOL/L (ref 136–145)
WBC # SPEC AUTO: 12.2 X10(3)/MCL (ref 4.5–11.5)

## 2023-11-09 PROCEDURE — 80048 BASIC METABOLIC PNL TOTAL CA: CPT | Performed by: INTERNAL MEDICINE

## 2023-11-09 PROCEDURE — 27000221 HC OXYGEN, UP TO 24 HOURS

## 2023-11-09 PROCEDURE — 25000003 PHARM REV CODE 250: Performed by: INTERNAL MEDICINE

## 2023-11-09 PROCEDURE — 85025 COMPLETE CBC W/AUTO DIFF WBC: CPT | Performed by: INTERNAL MEDICINE

## 2023-11-09 PROCEDURE — 21400001 HC TELEMETRY ROOM

## 2023-11-09 PROCEDURE — 25000003 PHARM REV CODE 250

## 2023-11-09 RX ORDER — ACETAMINOPHEN 325 MG/1
650 TABLET ORAL EVERY 6 HOURS PRN
Status: DISCONTINUED | OUTPATIENT
Start: 2023-11-09 | End: 2023-11-10 | Stop reason: HOSPADM

## 2023-11-09 RX ADMIN — MIDODRINE HYDROCHLORIDE 10 MG: 5 TABLET ORAL at 08:11

## 2023-11-09 RX ADMIN — ASPIRIN 81 MG: 81 TABLET, COATED ORAL at 08:11

## 2023-11-09 RX ADMIN — ACETAMINOPHEN 650 MG: 325 TABLET, FILM COATED ORAL at 03:11

## 2023-11-09 RX ADMIN — ACETAMINOPHEN 650 MG: 325 TABLET, FILM COATED ORAL at 09:11

## 2023-11-09 RX ADMIN — PSEUDOEPHEDRINE HCL 60 MG: 30 TABLET, FILM COATED ORAL at 08:11

## 2023-11-09 RX ADMIN — CLOPIDOGREL BISULFATE 75 MG: 75 TABLET ORAL at 08:11

## 2023-11-09 RX ADMIN — PSEUDOEPHEDRINE HCL 60 MG: 30 TABLET, FILM COATED ORAL at 12:11

## 2023-11-09 NOTE — OP NOTE
[facility] - 6th Floor Medical Telemetry  Cardiothoracic Surgery  Operative Note    SUMMARY     Date of Procedure: 11/8/2023     Procedure:  1.  Right carotid TCAR procedure  2.  Right carotid intravascular ultrasound    Co-Surgeon: PEPITO Zamarripa / Sae    Assistant: Chato Galvan    Pre-Operative Diagnosis:  1.  Severe right internal carotid artery stenosis    Post-Operative Diagnosis:  Same    Anesthesia: General    Operative Findings (including complications, if any):  Dictated    Description of Technical Procedures: Patient was lived to the cath lab and prepped and draped in the usual sterile fashion.  A 3 cm incision was made in Deepika's line just cranial to the sternoclavicular junction.  Subcutaneous tissues were dissected with the Bovie electrocautery.  The common carotid artery was identified in the carotid sheath and encircled with vessel loops.  Care was taken to not disrupt the vagus nerve and its course incised the wound was observed and preserved.  Heparin was administered.  Once the ACT was satisfactory continuous cerebral oximetry was employed and did not differ by more than 20% throughout the entire case.  The [employer] [address] dynamic flow reversal system was then delivered to the field.  Access to the common carotid artery was performed with a micropuncture access kit.  Five 0 Prolene pursestring was placed in the anterior surface of the carotid artery.  Under fluoroscopic guidance a diagnostic common carotid and internal carotid artery angiogram was performed.  The wire was then passed into the external carotid artery and the [address] flow reversal she was then passed over a stiff wire.  Dynamic flow reversal was instituted.  The shockwave lithotripsy device was then placed over a wire into the internal carotid artery and lithotripsy was performed as per the medical record.   Carotid angiogram was again performed.  The [address] carotid stent system was then delivered to the field  and the stent was deployed in the usual fashion with dynamic flow reversal.  Completion angiogram revealed the stent to be well deployed without evidence of significant stenosis.  IVUS was also employed and did not reveal any significant stenosis.  Patient was recovered from anesthesia and delivered to the recovery room in stable condition without any evidence neurological deficit.  There was minimal blood loss.     Estimated Blood Loss (EBL):  5 mL          Specimens:   Specimen (24h ago, onward)      None                    Condition: Stable    Disposition: PACU - hemodynamically stable.

## 2023-11-09 NOTE — PROGRESS NOTES
[facility] - 6th Floor Medical Telemetry    Cardiology  Progress Note    Patient Name: Teetee Soto  MRN: [id number]  Admission Date: 11/8/2023  Hospital Length of Stay: 1 days  Code Status: No Order   Attending Physician: Alec Quevedo MD   Primary Care Physician: Chela Graham FNP  Expected Discharge Date:   Principal Problem:<principal problem not specified>    Subjective:     Brief HPI: Ms. Soto is a 72 y/o female with a history of Bilateral Carotid Artery Stenosis, HTN, VHD, Murmur, PAD, HLD, who is known to Dr. Quevedo. She presented to [facility] on 11.8.23 for a scheduled TCAR. She underwent successful TCAR of the right internal carotid artery.     Hospital Course:   11.9.23: NAD. VSS. On Dopamine for BP management. Labs stables. No complaints of CP/SOB/Palps    PMH: Bilateral Carotid Artery Stenosis, HTN, VHD, PAD, Murmur, HLD  PSH: Cholecystectomy, Bilateral Tubal Ligation,. Left Mastectomy   Family History: Father - Stroke; Mother - HLD, HTN, Blood Clots   Social History: Former Smoker (Quit in 1965). Denies illicit drug use and alcohol use.     Previous Cardiac Diagnostics:   TCAR (11.8.23): Successful TCAR of the right internal carotid artery    Carotid US (9.5.23):  The study quality is good. 80-99% stenosis in the proximal right internal carotid artery based on Bluth Criteria. PSV ~355 cm/s. 60-79% stenosis in the proximal left internal carotid artery based on Bluth Criteria. PSV ~196 cm/s. Antegrade right vertebral artery flow. Antegrade left vertebral artery flow.        Review of Systems   Cardiovascular:  Negative for chest pain, dyspnea on exertion, leg swelling and palpitations.   Respiratory:  Negative for shortness of breath.    All other systems reviewed and are negative.      Objective:     Vital Signs (Most Recent):  Temp: 99 °F (37.2 °C) (11/08/23 2311)  Pulse: 81 (11/09/23 0703)  Resp: 18 (11/08/23 2311)  BP: 131/65 (11/09/23 0703)  SpO2: (!) 94 % (11/09/23 0400)  Vital Signs (24h Range):  Temp:  [96.8 °F (36 °C)-99 °F (37.2 °C)] 99 °F (37.2 °C)  Pulse:  [] 81  Resp:  [10-22] 18  SpO2:  [61 %-98 %] 94 %  BP: ()/(31-81) 131/65     Weight: 79.9 kg (176 lb 2.4 oz)  Body mass index is 32.22 kg/m².    SpO2: (!) 94 %         Intake/Output Summary (Last 24 hours) at 11/9/2023 0812  Last data filed at 11/8/2023 2342  Gross per 24 hour   Intake 340 ml   Output 300 ml   Net 40 ml       Lines/Drains/Airways       Peripheral Intravenous Line  Duration                  Peripheral IV - Single Lumen 11/08/23 0530 20 G Left Antecubital 1 day         Peripheral IV - Single Lumen 11/08/23 0530 22 G Posterior;Right Forearm 1 day                    Significant Labs:   Recent Results (from the past 72 hour(s))   ISTAT ACT-K    Collection Time: 11/08/23  8:28 AM   Result Value Ref Range    POC ACTIVATED CLOTTING TIME K 137 74 - 137 sec    Sample unknown    POCT glucose    Collection Time: 11/08/23 12:28 PM   Result Value Ref Range    POCT Glucose 91 70 - 110 mg/dL   Basic Metabolic Panel    Collection Time: 11/08/23  1:23 PM   Result Value Ref Range    Sodium Level 140 136 - 145 mmol/L    Potassium Level 4.1 3.5 - 5.1 mmol/L    Chloride 108 (H) 98 - 107 mmol/L    Carbon Dioxide 24 23 - 31 mmol/L    Glucose Level 89 82 - 115 mg/dL    Blood Urea Nitrogen 15.4 9.8 - 20.1 mg/dL    Creatinine 0.70 0.55 - 1.02 mg/dL    BUN/Creatinine Ratio 22     Calcium Level Total 8.3 (L) 8.4 - 10.2 mg/dL    Anion Gap 8.0 mEq/L    eGFR >60 mls/min/1.73/m2   CBC with Differential    Collection Time: 11/08/23  1:23 PM   Result Value Ref Range    WBC 8.70 4.50 - 11.50 x10(3)/mcL    RBC 3.81 (L) 4.20 - 5.40 x10(6)/mcL    Hgb 11.2 (L) 12.0 - 16.0 g/dL    Hct 33.9 (L) 37.0 - 47.0 %    MCV 89.0 80.0 - 94.0 fL    MCH 29.4 27.0 - 31.0 pg    MCHC 33.0 33.0 - 36.0 g/dL    RDW 13.7 11.5 - 17.0 %    Platelet 157 130 - 400 x10(3)/mcL    MPV 10.1 7.4 - 10.4 fL    Neut % 77.2 %    Lymph % 15.5 %    Mono % 6.0 %    Eos %  0.7 %    Basophil % 0.3 %    Lymph # 1.35 0.6 - 4.6 x10(3)/mcL    Neut # 6.71 2.1 - 9.2 x10(3)/mcL    Mono # 0.52 0.1 - 1.3 x10(3)/mcL    Eos # 0.06 0 - 0.9 x10(3)/mcL    Baso # 0.03 <=0.2 x10(3)/mcL    IG# 0.03 0 - 0.04 x10(3)/mcL    IG% 0.3 %    NRBC% 0.0 %   POCT glucose    Collection Time: 11/08/23  4:09 PM   Result Value Ref Range    POCT Glucose 115 (H) 70 - 110 mg/dL   Basic metabolic panel    Collection Time: 11/09/23  6:12 AM   Result Value Ref Range    Sodium Level 137 136 - 145 mmol/L    Potassium Level 3.6 3.5 - 5.1 mmol/L    Chloride 106 98 - 107 mmol/L    Carbon Dioxide 25 23 - 31 mmol/L    Glucose Level 127 (H) 82 - 115 mg/dL    Blood Urea Nitrogen 15.0 9.8 - 20.1 mg/dL    Creatinine 0.75 0.55 - 1.02 mg/dL    BUN/Creatinine Ratio 20     Calcium Level Total 8.7 8.4 - 10.2 mg/dL    Anion Gap 6.0 mEq/L    eGFR >60 mls/min/1.73/m2   CBC with Differential    Collection Time: 11/09/23  6:12 AM   Result Value Ref Range    WBC 12.20 (H) 4.50 - 11.50 x10(3)/mcL    RBC 3.90 (L) 4.20 - 5.40 x10(6)/mcL    Hgb 11.3 (L) 12.0 - 16.0 g/dL    Hct 34.4 (L) 37.0 - 47.0 %    MCV 88.2 80.0 - 94.0 fL    MCH 29.0 27.0 - 31.0 pg    MCHC 32.8 (L) 33.0 - 36.0 g/dL    RDW 13.7 11.5 - 17.0 %    Platelet 181 130 - 400 x10(3)/mcL    MPV 10.5 (H) 7.4 - 10.4 fL    Neut % 79.9 %    Lymph % 9.9 %    Mono % 9.6 %    Eos % 0.1 %    Basophil % 0.2 %    Lymph # 1.21 0.6 - 4.6 x10(3)/mcL    Neut # 9.75 (H) 2.1 - 9.2 x10(3)/mcL    Mono # 1.17 0.1 - 1.3 x10(3)/mcL    Eos # 0.01 0 - 0.9 x10(3)/mcL    Baso # 0.02 <=0.2 x10(3)/mcL    IG# 0.04 0 - 0.04 x10(3)/mcL    IG% 0.3 %    NRBC% 0.0 %       Telemetry:      Physical Exam  Vitals and nursing note reviewed.   Constitutional:       Appearance: Normal appearance.   HENT:      Head: Normocephalic.      Nose: Nose normal.      Mouth/Throat:      Mouth: Mucous membranes are moist.   Eyes:      Pupils: Pupils are equal, round, and reactive to light.   Cardiovascular:      Rate and Rhythm: Normal  rate and regular rhythm.      Pulses: Normal pulses.      Heart sounds: Normal heart sounds. No murmur heard.  Pulmonary:      Effort: Pulmonary effort is normal.      Breath sounds: Normal breath sounds.   Abdominal:      General: Bowel sounds are normal.      Palpations: Abdomen is soft.   Musculoskeletal:         General: Normal range of motion.   Skin:     General: Skin is warm.      Comments: L Groin Soft/Flat, Non-Tender, No Sign of Bleed/Infection. +2 BLE Palpable Pedal Pulses    R Neck Soft/Flat, Non-Tender, No Sign of Bleed/Infection, Dermabond Present.        Neurological:      Mental Status: She is alert and oriented to person, place, and time.   Psychiatric:         Mood and Affect: Mood normal.         Behavior: Behavior normal.       Current Inpatient Medications:  Current Facility-Administered Medications:     acetaminophen tablet 650 mg, 650 mg, Oral, Q4H PRN, Alec Quevedo MD    aspirin EC tablet 81 mg, 81 mg, Oral, Daily, Alec Quevedo MD    clopidogreL tablet 75 mg, 75 mg, Oral, Daily, Alec Quevedo MD    DOPamine 800 mg in dextrose 5 % 250 mL infusion (premix) (NON-TITRATING), 2 mcg/kg/min, Intravenous, Continuous, AugustoMiriam polanco, FNP, Last Rate: 6 mL/hr at 11/08/23 1456, 4 mcg/kg/min at 11/08/23 1456    hydrALAZINE injection 10 mg, 10 mg, Intravenous, Q4H PRN, Alec Quevedo MD    HYDROcodone-acetaminophen 5-325 mg per tablet 1 tablet, 1 tablet, Oral, Q4H PRN, Alec Quevedo MD    midodrine tablet 10 mg, 10 mg, Oral, TID PRN, Alec Quevedo MD, 10 mg at 11/08/23 1151    morphine injection 2 mg, 2 mg, Intravenous, Q4H PRN, Alec Quevedo MD    ondansetron disintegrating tablet 8 mg, 8 mg, Oral, Q8H PRN, Alec Quevedo MD, 8 mg at 11/08/23 1344    pseudoephedrine tablet 60 mg, 60 mg, Oral, TID PRN, Alec Quevedo MD, 60 mg at 11/09/23 0046    Facility-Administered Medications Ordered in Other Encounters:     lactated ringers infusion, , Intravenous, Continuous, Baudoin, Jose Guadalupe E, DO, Last Rate: 10 mL/hr  "at 06/14/22 0550, New Bag at 06/14/22 0550    VTE Risk Mitigation (From admission, onward)      None            Assessment:   Bilateral Carotid Artery Stenosis    - s/p TCAR (11.8.23)"  Hypotension    - requiring Dopamine infusion    - History of HTN  VHD    - Moderate AS  PAD  Murmur  HLD  Obesity  No known History of GI Bleed     Plan:   Wean Dopamine Infusion off  Keep Systolic Blood Pressure > 120  Continue ASA and Plavix   Labs in AM: CBC, BMP, and Mag     ROSALIE Hurd  Cardiology  [facility] - 6th Floor Medical Telemetry  11/09/2023    "

## 2023-11-09 NOTE — PLAN OF CARE
11/09/23 1549   Medicare Message   Important Message from Medicare regarding Discharge Appeal Rights Given to patient/caregiver;Explained to patient/caregiver

## 2023-11-09 NOTE — PLAN OF CARE
11/09/23 1544   Discharge Assessment   Assessment Type Discharge Planning Assessment   Confirmed/corrected address, phone number and insurance Yes   Source of Information patient   When was your last doctors appointment?   (PCP is ROSALIE Forbes)   Reason For Admission TCAR   People in Home friend(s)   Do you expect to return to your current living situation? Yes   Do you have help at home or someone to help you manage your care at home? Yes   Who are your caregiver(s) and their phone number(s)? Efrain/Friend/[phone]   Current cognitive status: Alert/Oriented   Home Accessibility stairs to enter home   Number of Stairs, Main Entrance three   Home Layout Able to live on 1st floor   Equipment Currently Used at Home none   Readmission within 30 days? No   Do you currently have service(s) that help you manage your care at home? No   Do you take prescription medications? Yes   Do you have prescription coverage? Yes   Do you have any problems affording any of your prescribed medications? No   Who is going to help you get home at discharge? Friend   How do you get to doctors appointments? car, drives self   Are you on dialysis? No   Do you take coumadin? No   Discharge Plan discussed with: Patient   Discharge Plan A Home   Discharge Plan B Home with family   Housing Stability   In the last 12 months, was there a time when you were not able to pay the mortgage or rent on time? N   In the last 12 months, was there a time when you did not have a steady place to sleep or slept in a shelter (including now)? N   Transportation Needs   In the past 12 months, has lack of transportation kept you from medical appointments or from getting medications? no   In the past 12 months, has lack of transportation kept you from meetings, work, or from getting things needed for daily living? No   Food Insecurity   Within the past 12 months, you worried that your food would run out before you got the money to buy more. Never true    Within the past 12 months, the food you bought just didn't last and you didn't have money to get more. Never true     Independent and working part time prior to admit.

## 2023-11-10 VITALS
HEART RATE: 79 BPM | WEIGHT: 176.13 LBS | HEIGHT: 62 IN | RESPIRATION RATE: 18 BRPM | SYSTOLIC BLOOD PRESSURE: 118 MMHG | DIASTOLIC BLOOD PRESSURE: 64 MMHG | OXYGEN SATURATION: 89 % | BODY MASS INDEX: 32.41 KG/M2 | TEMPERATURE: 100 F

## 2023-11-10 LAB
ANION GAP SERPL CALC-SCNC: 6 MEQ/L
BASOPHILS # BLD AUTO: 0.02 X10(3)/MCL
BASOPHILS NFR BLD AUTO: 0.3 %
BUN SERPL-MCNC: 12.4 MG/DL (ref 9.8–20.1)
CALCIUM SERPL-MCNC: 8.1 MG/DL (ref 8.4–10.2)
CHLORIDE SERPL-SCNC: 108 MMOL/L (ref 98–107)
CO2 SERPL-SCNC: 25 MMOL/L (ref 23–31)
CREAT SERPL-MCNC: 0.66 MG/DL (ref 0.55–1.02)
CREAT/UREA NIT SERPL: 19
EOSINOPHIL # BLD AUTO: 0.05 X10(3)/MCL (ref 0–0.9)
EOSINOPHIL NFR BLD AUTO: 0.6 %
ERYTHROCYTE [DISTWIDTH] IN BLOOD BY AUTOMATED COUNT: 14 % (ref 11.5–17)
GFR SERPLBLD CREATININE-BSD FMLA CKD-EPI: >60 MLS/MIN/1.73/M2
GLUCOSE SERPL-MCNC: 108 MG/DL (ref 82–115)
HCT VFR BLD AUTO: 27.4 % (ref 37–47)
HGB BLD-MCNC: 8.9 G/DL (ref 12–16)
IMM GRANULOCYTES # BLD AUTO: 0.02 X10(3)/MCL (ref 0–0.04)
IMM GRANULOCYTES NFR BLD AUTO: 0.3 %
LYMPHOCYTES # BLD AUTO: 1.23 X10(3)/MCL (ref 0.6–4.6)
LYMPHOCYTES NFR BLD AUTO: 15.4 %
MAGNESIUM SERPL-MCNC: 2.1 MG/DL (ref 1.6–2.6)
MCH RBC QN AUTO: 29 PG (ref 27–31)
MCHC RBC AUTO-ENTMCNC: 32.5 G/DL (ref 33–36)
MCV RBC AUTO: 89.3 FL (ref 80–94)
MONOCYTES # BLD AUTO: 0.82 X10(3)/MCL (ref 0.1–1.3)
MONOCYTES NFR BLD AUTO: 10.3 %
NEUTROPHILS # BLD AUTO: 5.86 X10(3)/MCL (ref 2.1–9.2)
NEUTROPHILS NFR BLD AUTO: 73.1 %
NRBC BLD AUTO-RTO: 0 %
PLATELET # BLD AUTO: 138 X10(3)/MCL (ref 130–400)
PMV BLD AUTO: 10.6 FL (ref 7.4–10.4)
POCT GLUCOSE: 93 MG/DL (ref 70–110)
POTASSIUM SERPL-SCNC: 3.8 MMOL/L (ref 3.5–5.1)
RBC # BLD AUTO: 3.07 X10(6)/MCL (ref 4.2–5.4)
SODIUM SERPL-SCNC: 139 MMOL/L (ref 136–145)
WBC # SPEC AUTO: 8 X10(3)/MCL (ref 4.5–11.5)

## 2023-11-10 PROCEDURE — 85025 COMPLETE CBC W/AUTO DIFF WBC: CPT

## 2023-11-10 PROCEDURE — 80048 BASIC METABOLIC PNL TOTAL CA: CPT

## 2023-11-10 PROCEDURE — 83735 ASSAY OF MAGNESIUM: CPT

## 2023-11-10 PROCEDURE — 25000003 PHARM REV CODE 250: Performed by: INTERNAL MEDICINE

## 2023-11-10 PROCEDURE — 27000221 HC OXYGEN, UP TO 24 HOURS

## 2023-11-10 RX ORDER — LISINOPRIL 20 MG/1
10 TABLET ORAL 2 TIMES DAILY
Qty: 30 TABLET | Refills: 1 | Status: SHIPPED | OUTPATIENT
Start: 2023-11-10 | End: 2023-11-10 | Stop reason: SDUPTHER

## 2023-11-10 RX ORDER — LISINOPRIL 20 MG/1
10 TABLET ORAL DAILY
Qty: 30 TABLET | Refills: 1 | Status: SHIPPED | OUTPATIENT
Start: 2023-11-10

## 2023-11-10 RX ORDER — PSEUDOEPHEDRINE HYDROCHLORIDE 60 MG/1
60 TABLET ORAL 3 TIMES DAILY PRN
Qty: 6 TABLET | Refills: 0 | Status: SHIPPED | OUTPATIENT
Start: 2023-11-10 | End: 2023-11-20

## 2023-11-10 RX ADMIN — CLOPIDOGREL BISULFATE 75 MG: 75 TABLET ORAL at 08:11

## 2023-11-10 RX ADMIN — ASPIRIN 81 MG: 81 TABLET, COATED ORAL at 08:11

## 2023-11-10 NOTE — DISCHARGE SUMMARY
"[facility] - 6th Floor Medical Telemetry    Cardiology  Discharge Summary      Patient Name: Teetee Soto  MRN: [id number]  Admission Date: 11/8/2023  Hospital Length of Stay: 2 days  Discharge Date and Time:  11/10/2023 11:10 AM  Attending Physician: Alec Quevedo MD  Discharging Provider: ROSALIE Hurd  Primary Care Physician: Rosita Hurtado FNP    HPI: Ms. Soto is a 74 y/o female with a history of Bilateral Carotid Artery Stenosis, HTN, VHD, Murmur, PAD, HLD, who is known to Dr. Quevedo. She presented to [facility] on 11.8.23 for a scheduled TCAR. She underwent successful TCAR of the right internal carotid artery.     Hospital Course:   11.9.23: NAD. VSS. On Dopamine for BP management. Labs stables. No complaints of CP/SOB/Palps  11.10.23: NAD. VSS. No complaints of CP/SOB/Palps. "I'm okay. Ready to go home" BP stable.      PMH: Bilateral Carotid Artery Stenosis, HTN, VHD, PAD, Murmur, HLD  PSH: Cholecystectomy, Bilateral Tubal Ligation,. Left Mastectomy   Family History: Father - Stroke; Mother - HLD, HTN, Blood Clots   Social History: Former Smoker (Quit in 1965). Denies illicit drug use and alcohol use.      Previous Cardiac Diagnostics:   TCAR (11.8.23): Successful TCAR of the right internal carotid artery     Carotid US (9.5.23):  The study quality is good. 80-99% stenosis in the proximal right internal carotid artery based on Bluth Criteria. PSV ~355 cm/s. 60-79% stenosis in the proximal left internal carotid artery based on Bluth Criteria. PSV ~196 cm/s. Antegrade right vertebral artery flow. Antegrade left vertebral artery flow.     Procedure(s) (LRB):  Stent, Carotid W/ Protect (N/A)     Consults:   Consults (From admission, onward)          Status Ordering Provider     Inpatient consult to Cardiothoracic Surgery  Once        Provider:  Antwan Zamarripa MD    Acknowledged ALEC QUEVEDO              There are no hospital problems to display for this patient.      Discharged Condition: " stable    Review of Systems   Cardiovascular:  Negative for chest pain, dyspnea on exertion, leg swelling and palpitations.   Respiratory:  Negative for shortness of breath.    All other systems reviewed and are negative.      Physical Exam  Vitals and nursing note reviewed.   Constitutional:       Appearance: Normal appearance.   HENT:      Head: Normocephalic.      Nose: Nose normal.      Mouth/Throat:      Mouth: Mucous membranes are moist.   Eyes:      Pupils: Pupils are equal, round, and reactive to light.   Cardiovascular:      Rate and Rhythm: Normal rate and regular rhythm.      Pulses: Normal pulses.      Heart sounds: Normal heart sounds. No murmur heard.  Pulmonary:      Effort: Pulmonary effort is normal. No respiratory distress.      Breath sounds: Normal breath sounds.   Abdominal:      General: Bowel sounds are normal.      Palpations: Abdomen is soft.   Musculoskeletal:         General: Normal range of motion.      Cervical back: Normal range of motion.   Skin:     General: Skin is warm.      Comments: L Groin Soft/Flat, Non-Tender, No Sign of Bleed/Infection. +2 BLE Palpable Pedal Pulses     R Neck Soft/Flat, Non-Tender, No Sign of Bleed/Infection, Dermabond Present.    Neurological:      Mental Status: She is alert and oriented to person, place, and time.   Psychiatric:         Mood and Affect: Mood normal.         Behavior: Behavior normal.         Disposition: Home     Follow Up:   Follow-up Information       Alec Quevedo MD Follow up on 11/16/2023.    Specialty: Cardiology  Why: @ 9:45AM for carotid ultrasound  @ 10:50AM for TCAR follow up.  Contact information:  [address] [place] [address] [id number] [phone]                           Patient Instructions:   No discharge procedures on file.  Medications:  Reconciled Home Medications:      Medication List        START taking these medications      pseudoephedrine 60 MG tablet  Commonly known as: SUDAFED  Take 1 tablet (60 mg total) by mouth  3 (three) times daily as needed (give for SBP<100).            CHANGE how you take these medications      lisinopriL 20 MG tablet  Commonly known as: PRINIVIL,ZESTRIL  Take 0.5 tablets (10 mg total) by mouth 2 (two) times a day.  What changed: how much to take            CONTINUE taking these medications      ascorbic acid (vitamin C) 1000 MG tablet  Commonly known as: VITAMIN C  Take 1,000 mg by mouth every morning.     aspirin 81 MG EC tablet  Commonly known as: ECOTRIN  Take 81 mg by mouth nightly.     cholecalciferol (vitamin D3) 25 mcg (1,000 unit) capsule  Commonly known as: VITAMIN D3  Take 1,000 Units by mouth every morning.     clopidogreL 75 mg tablet  Commonly known as: PLAVIX  Take 75 mg by mouth every evening.     FLUoxetine 10 MG capsule  Take 10 mg by mouth every evening.     ketoconazole 2 % shampoo  Commonly known as: NIZORAL  Apply 1 Application topically as needed for Itching.     magnesium oxide 400 mg magnesium Cap  Take 1 tablet by mouth 2 (two) times a day.     meloxicam 7.5 MG tablet  Commonly known as: MOBIC  Take 7.5 mg by mouth once daily.     metoprolol tartrate 25 MG tablet  Commonly known as: LOPRESSOR  Take 25 mg by mouth 2 (two) times daily.     rosuvastatin 20 MG tablet  Commonly known as: CRESTOR  Take 20 mg by mouth every evening.            STOP taking these medications      hydrALAZINE 25 MG tablet  Commonly known as: APRESOLINE     mometasone 0.1 % solution  Commonly known as: ELOCON     triamterene-hydrochlorothiazide 37.5-25 mg 37.5-25 mg per capsule  Commonly known as: DYAZIDE     valACYclovir 1000 MG tablet  Commonly known as: VALTREX     zinc 50 mg Tab              Impression:  Bilateral Carotid Artery Stenosis    - s/p TCAR (11.8.23)  Hypotension (Resolved)    - requiring Dopamine infusion    - History of HTN  COPD ?  VHD    - Moderate AS  PAD  Murmur  HLD  Obesity  No known History of GI Bleed     Plan:   Discharge Home  Continue Metoprolol 25 mg oral BID   Decrease  Lisinopril to 10 mg oral daily  Hold Hydralazine and Triamterene-HCTz  Monitor Blood Pressures at Home   Groin Precautions    - No driving for 48 hours    - Showers only for 5-7 days    - Do not lift anything greater than 5 pounds for 5-7 days    - Monitor for signs of infections/bleeding   Follow-up with Dr. Quevedo in 1-2 weeks     Time spent on the discharge of patient: 32 minutes    ROSALIE Hurd  Cardiology  [facility] - 6th Floor Medical Telemetry

## 2023-11-14 ENCOUNTER — PATIENT OUTREACH (OUTPATIENT)
Dept: ADMINISTRATIVE | Facility: CLINIC | Age: 73
End: 2023-11-14
Payer: MEDICARE

## 2023-11-14 NOTE — PROGRESS NOTES
C3 nurse spoke with Teetee Soto for a TCC post hospital discharge follow up call. The patient is not applicable for a TCC call at this time, as she is currently IP at a hospital in [place] d/t [facility]. The patient has a scheduled HOSFU appointment with Alec Quevedo MD (cardiology) 11/16/23 @ 9:45am US & 10:50am for FU. She hopes to be discharged soon on home O2, and plans to keep the above appointment at this time.

## 2023-12-26 DIAGNOSIS — I70.203 BILATERAL FEMORAL ARTERY STENOSIS: ICD-10-CM

## 2023-12-26 DIAGNOSIS — I70.90 ARTERIOSCLEROTIC VASCULAR DISEASE: ICD-10-CM

## 2023-12-26 DIAGNOSIS — I10 ESSENTIAL HYPERTENSION, MALIGNANT: ICD-10-CM

## 2023-12-26 DIAGNOSIS — I65.23 BILATERAL CAROTID ARTERY OCCLUSION: ICD-10-CM

## 2023-12-26 DIAGNOSIS — E78.5 HYPERLIPEMIA: ICD-10-CM

## 2023-12-26 DIAGNOSIS — I35.0 NODULAR CALCIFIC AORTIC VALVE STENOSIS: Primary | ICD-10-CM

## 2023-12-26 DIAGNOSIS — R01.1 CARDIAC MURMUR: ICD-10-CM

## 2023-12-28 ENCOUNTER — PROCEDURE VISIT (OUTPATIENT)
Dept: RESPIRATORY THERAPY | Facility: HOSPITAL | Age: 73
End: 2023-12-28
Attending: INTERNAL MEDICINE
Payer: MEDICARE

## 2023-12-28 VITALS — OXYGEN SATURATION: 91 % | HEART RATE: 60 BPM | RESPIRATION RATE: 18 BRPM

## 2023-12-28 DIAGNOSIS — I10 ESSENTIAL HYPERTENSION, MALIGNANT: ICD-10-CM

## 2023-12-28 DIAGNOSIS — I70.203 BILATERAL FEMORAL ARTERY STENOSIS: ICD-10-CM

## 2023-12-28 DIAGNOSIS — E78.5 HYPERLIPEMIA: ICD-10-CM

## 2023-12-28 DIAGNOSIS — R01.1 CARDIAC MURMUR: ICD-10-CM

## 2023-12-28 DIAGNOSIS — I65.23 BILATERAL CAROTID ARTERY OCCLUSION: ICD-10-CM

## 2023-12-28 DIAGNOSIS — I35.0 NODULAR CALCIFIC AORTIC VALVE STENOSIS: ICD-10-CM

## 2023-12-28 DIAGNOSIS — I70.90 ARTERIOSCLEROTIC VASCULAR DISEASE: ICD-10-CM

## 2023-12-28 PROCEDURE — 94060 EVALUATION OF WHEEZING: CPT

## 2023-12-28 PROCEDURE — 94729 DIFFUSING CAPACITY: CPT

## 2023-12-28 PROCEDURE — 94727 GAS DIL/WSHOT DETER LNG VOL: CPT

## 2023-12-28 PROCEDURE — 94760 N-INVAS EAR/PLS OXIMETRY 1: CPT

## 2023-12-28 RX ORDER — ALBUTEROL SULFATE 0.83 MG/ML
2.5 SOLUTION RESPIRATORY (INHALATION)
Status: COMPLETED | OUTPATIENT
Start: 2023-12-28 | End: 2023-12-28

## 2023-12-28 RX ORDER — ALBUTEROL SULFATE 0.83 MG/ML
SOLUTION RESPIRATORY (INHALATION)
Status: DISPENSED
Start: 2023-12-28 | End: 2023-12-29

## 2023-12-28 RX ADMIN — ALBUTEROL SULFATE 2.5 MG: 0.83 SOLUTION RESPIRATORY (INHALATION) at 08:12

## 2023-12-28 NOTE — PROGRESS NOTES
GREAT PT EFFORT AND COOPERATION    PT STATED SHE DID TAKE A ALBUTEROL NEB, WHEN I TOLD HER I WAS GOING TO ADMINISTER A NEB AFTER LUNG VOLUME TEST  TEST WAS COMPLETED SINCE WE WERE HALF WAY THROUGH PFT    PFT COMPLETE

## 2024-08-12 ENCOUNTER — TELEPHONE (OUTPATIENT)
Dept: OBSTETRICS AND GYNECOLOGY | Facility: CLINIC | Age: 74
End: 2024-08-12
Payer: MEDICARE

## 2024-08-12 DIAGNOSIS — Z12.31 SCREENING MAMMOGRAM FOR BREAST CANCER: Primary | ICD-10-CM

## 2024-08-12 NOTE — TELEPHONE ENCOUNTER
Order faxed to [facility] imaging----- Message from Marielos Rowland CMA sent at 8/12/2024  9:57 AM CDT -----  Regarding: MAMMO  Pt needs an order for a mammo sent to [facility]. Is not due for an annual until next year.

## 2024-09-05 ENCOUNTER — DOCUMENTATION ONLY (OUTPATIENT)
Dept: OBSTETRICS AND GYNECOLOGY | Facility: CLINIC | Age: 74
End: 2024-09-05
Payer: MEDICARE

## 2024-11-14 PROBLEM — M70.72 BURSITIS OF LEFT HIP: Status: ACTIVE | Noted: 2024-11-14

## 2024-11-14 PROBLEM — M70.72 BURSITIS OF LEFT HIP: Chronic | Status: ACTIVE | Noted: 2024-11-14

## 2025-08-21 ENCOUNTER — OFFICE VISIT (OUTPATIENT)
Dept: OBSTETRICS AND GYNECOLOGY | Facility: CLINIC | Age: 75
End: 2025-08-21
Payer: MEDICARE

## 2025-08-21 VITALS
RESPIRATION RATE: 18 BRPM | HEART RATE: 60 BPM | SYSTOLIC BLOOD PRESSURE: 128 MMHG | HEIGHT: 61 IN | DIASTOLIC BLOOD PRESSURE: 68 MMHG | WEIGHT: 173 LBS | OXYGEN SATURATION: 98 % | BODY MASS INDEX: 32.66 KG/M2

## 2025-08-21 DIAGNOSIS — Z12.31 SCREENING MAMMOGRAM FOR BREAST CANCER: ICD-10-CM

## 2025-08-21 DIAGNOSIS — Z12.4 SCREENING FOR CERVICAL CANCER: ICD-10-CM

## 2025-08-21 DIAGNOSIS — Z01.419 ROUTINE GYNECOLOGICAL EXAMINATION: Primary | ICD-10-CM

## 2025-08-21 RX ORDER — LISINOPRIL 40 MG/1
40 TABLET ORAL DAILY
COMMUNITY

## 2025-08-21 RX ORDER — NIFEDIPINE 90 MG/1
90 TABLET, EXTENDED RELEASE ORAL DAILY
COMMUNITY

## (undated) DEVICE — ENHANCE TRANSCAROTID ACCESS KIT

## (undated) DEVICE — GLOVE PROTEXIS HYDROGEL SZ7.5

## (undated) DEVICE — BLLN VIATRAC 6X30 135CM

## (undated) DEVICE — GLOVE PROTEXIS LTX 6.5

## (undated) DEVICE — GUIDEWIRE INQWIRE .021IN 180CM

## (undated) DEVICE — SILICONE VESSEL LOOPS

## (undated) DEVICE — DEVICE INDEFLATOR BASIX

## (undated) DEVICE — SUT PROLENE 5/0 RB-1 36 IN

## (undated) DEVICE — PAD UNIV GROUNDING SPLIT CORD

## (undated) DEVICE — BANDAGE COBAN COLOR ASSORT 3X5

## (undated) DEVICE — SPONGE GAUZE 16PLY 4X4

## (undated) DEVICE — DRESSING N ADH OIL EMUL 3X3

## (undated) DEVICE — SEE MEDLINE ITEM 157216

## (undated) DEVICE — SUT 4.0 ETHILON

## (undated) DEVICE — CUFF ATS 2 PORT SNGL BLDR 18IN

## (undated) DEVICE — SUT SILK 2-0 SH 18IN BLACK

## (undated) DEVICE — APPLICATOR CHLORAPREP ORN 26ML

## (undated) DEVICE — BANDAGE ESMARK ELASTIC ST 4X9

## (undated) DEVICE — DRESSING TRANS 4X4 TEGADERM

## (undated) DEVICE — SYS ENROUTE TCAR NEURPROTCT

## (undated) DEVICE — SUT VICRYL 3-0 27 SH

## (undated) DEVICE — NDL HYPO POLYPR STD 26G 1.5IN

## (undated) DEVICE — COVER LIGHT HANDLE CHROMOPHARE

## (undated) DEVICE — CATH VISIONS PV RX IVUS .014P

## (undated) DEVICE — SEE MEDLINE ITEM 157173

## (undated) DEVICE — KIT SURGIFLO HEMOSTATIC MATRIX

## (undated) DEVICE — Device

## (undated) DEVICE — COVER PROBE US 5.5X58L NON LTX

## (undated) DEVICE — DURAPREP SURG SCRUB 26ML

## (undated) DEVICE — SYR 10CC LUER LOCK

## (undated) DEVICE — HEMOSTAT SURGICEL NU-KNIT 6X9

## (undated) DEVICE — DRESSING ADAPTIC TOUCH 3X4

## (undated) DEVICE — SEE MEDLINE ITEM 157117

## (undated) DEVICE — GUIDEWIRE ENROUTE .014IN 95CM

## (undated) DEVICE — PAD HEARTSTART DEFIB ADULT

## (undated) DEVICE — GLOVE PROTEXIS NEOPRN BRN SZ7

## (undated) DEVICE — SET ANGIO ACIST CVI ANGIOTOUCH

## (undated) DEVICE — GLOVE PROTEXIS HYDROGEL SZ6.5

## (undated) DEVICE — SUT MCRYL PLUS 4-0 PS2 27IN

## (undated) DEVICE — WRAP COBAN NL STRL 4INX5YD

## (undated) DEVICE — SYS WASTE FLD DISPOSAL 1400ML

## (undated) DEVICE — ADHESIVE DERMABOND ADVANCED